# Patient Record
Sex: FEMALE | Race: WHITE | Employment: OTHER | URBAN - METROPOLITAN AREA
[De-identification: names, ages, dates, MRNs, and addresses within clinical notes are randomized per-mention and may not be internally consistent; named-entity substitution may affect disease eponyms.]

---

## 2023-03-09 DIAGNOSIS — E78.5 HYPERLIPIDEMIA, UNSPECIFIED HYPERLIPIDEMIA TYPE: Primary | ICD-10-CM

## 2023-03-09 RX ORDER — ATORVASTATIN CALCIUM 10 MG/1
TABLET, FILM COATED ORAL
Qty: 90 TABLET | Refills: 3 | Status: SHIPPED | OUTPATIENT
Start: 2023-03-09 | End: 2024-04-01 | Stop reason: SDUPTHER

## 2023-05-07 DIAGNOSIS — E03.9 HYPOTHYROIDISM, UNSPECIFIED TYPE: Primary | ICD-10-CM

## 2023-05-08 RX ORDER — LEVOTHYROXINE SODIUM 88 UG/1
TABLET ORAL
Qty: 90 TABLET | Refills: 3 | Status: SHIPPED | OUTPATIENT
Start: 2023-05-08 | End: 2024-03-08 | Stop reason: SDUPTHER

## 2023-05-25 ENCOUNTER — TELEPHONE (OUTPATIENT)
Dept: PRIMARY CARE | Facility: CLINIC | Age: 83
End: 2023-05-25

## 2023-05-25 ENCOUNTER — TELEPHONE (OUTPATIENT)
Dept: PRIMARY CARE | Facility: CLINIC | Age: 83
End: 2023-05-25
Payer: MEDICARE

## 2023-05-25 DIAGNOSIS — R73.03 PREDIABETES: ICD-10-CM

## 2023-05-25 DIAGNOSIS — M81.0 OSTEOPOROSIS WITHOUT CURRENT PATHOLOGICAL FRACTURE, UNSPECIFIED OSTEOPOROSIS TYPE: ICD-10-CM

## 2023-05-25 DIAGNOSIS — E78.2 MIXED HYPERLIPIDEMIA: ICD-10-CM

## 2023-05-25 DIAGNOSIS — E03.9 HYPOTHYROIDISM, ADULT: Primary | ICD-10-CM

## 2023-05-25 PROBLEM — L93.0 CHRONIC DISCOID LUPUS ERYTHEMATOSUS: Status: ACTIVE | Noted: 2023-05-25

## 2023-05-25 PROBLEM — T78.40XA ALLERGY: Status: ACTIVE | Noted: 2023-05-25

## 2023-05-25 PROBLEM — G45.9 TIA (TRANSIENT ISCHEMIC ATTACK): Status: ACTIVE | Noted: 2023-05-25

## 2023-05-25 PROBLEM — I77.3 FIBROMUSCULAR DYSPLASIA (CMS-HCC): Status: ACTIVE | Noted: 2023-05-25

## 2023-05-25 PROBLEM — M19.041 OSTEOARTHRITIS OF FINGER OF RIGHT HAND: Status: ACTIVE | Noted: 2023-05-25

## 2023-05-25 PROBLEM — I95.1 AUTONOMIC POSTURAL HYPOTENSION: Status: ACTIVE | Noted: 2023-05-25

## 2023-05-25 PROBLEM — I65.23 ATHEROSCLEROSIS OF BOTH CAROTID ARTERIES: Status: ACTIVE | Noted: 2023-05-25

## 2023-05-25 PROBLEM — G43.109 MIGRAINE AURA WITHOUT HEADACHE: Status: ACTIVE | Noted: 2023-05-25

## 2023-05-25 PROBLEM — M19.049 OSTEOARTHRITIS, HAND: Status: ACTIVE | Noted: 2023-05-25

## 2023-05-25 PROBLEM — L80 PRIMARY VITILIGO: Status: ACTIVE | Noted: 2023-05-25

## 2023-05-31 ENCOUNTER — LAB (OUTPATIENT)
Dept: LAB | Facility: LAB | Age: 83
End: 2023-05-31
Payer: MEDICARE

## 2023-05-31 DIAGNOSIS — E78.2 MIXED HYPERLIPIDEMIA: ICD-10-CM

## 2023-05-31 DIAGNOSIS — M81.0 OSTEOPOROSIS WITHOUT CURRENT PATHOLOGICAL FRACTURE, UNSPECIFIED OSTEOPOROSIS TYPE: ICD-10-CM

## 2023-05-31 DIAGNOSIS — E03.9 HYPOTHYROIDISM, ADULT: ICD-10-CM

## 2023-05-31 DIAGNOSIS — R73.03 PREDIABETES: ICD-10-CM

## 2023-05-31 LAB
ALANINE AMINOTRANSFERASE (SGPT) (U/L) IN SER/PLAS: 15 U/L (ref 7–45)
ALBUMIN (G/DL) IN SER/PLAS: 4.2 G/DL (ref 3.4–5)
ALKALINE PHOSPHATASE (U/L) IN SER/PLAS: 53 U/L (ref 33–136)
ANION GAP IN SER/PLAS: 11 MMOL/L (ref 10–20)
ASPARTATE AMINOTRANSFERASE (SGOT) (U/L) IN SER/PLAS: 22 U/L (ref 9–39)
BASOPHILS (10*3/UL) IN BLOOD BY AUTOMATED COUNT: 0.05 X10E9/L (ref 0–0.1)
BASOPHILS/100 LEUKOCYTES IN BLOOD BY AUTOMATED COUNT: 1 % (ref 0–2)
BILIRUBIN TOTAL (MG/DL) IN SER/PLAS: 0.8 MG/DL (ref 0–1.2)
CALCIDIOL (25 OH VITAMIN D3) (NG/ML) IN SER/PLAS: 79 NG/ML
CALCIUM (MG/DL) IN SER/PLAS: 9.9 MG/DL (ref 8.6–10.6)
CARBON DIOXIDE, TOTAL (MMOL/L) IN SER/PLAS: 31 MMOL/L (ref 21–32)
CHLORIDE (MMOL/L) IN SER/PLAS: 101 MMOL/L (ref 98–107)
CHOLESTEROL (MG/DL) IN SER/PLAS: 179 MG/DL (ref 0–199)
CHOLESTEROL IN HDL (MG/DL) IN SER/PLAS: 80.2 MG/DL
CHOLESTEROL/HDL RATIO: 2.2
CREATININE (MG/DL) IN SER/PLAS: 0.81 MG/DL (ref 0.5–1.05)
EOSINOPHILS (10*3/UL) IN BLOOD BY AUTOMATED COUNT: 0.1 X10E9/L (ref 0–0.4)
EOSINOPHILS/100 LEUKOCYTES IN BLOOD BY AUTOMATED COUNT: 1.9 % (ref 0–6)
ERYTHROCYTE DISTRIBUTION WIDTH (RATIO) BY AUTOMATED COUNT: 13.2 % (ref 11.5–14.5)
ERYTHROCYTE MEAN CORPUSCULAR HEMOGLOBIN CONCENTRATION (G/DL) BY AUTOMATED: 32.2 G/DL (ref 32–36)
ERYTHROCYTE MEAN CORPUSCULAR VOLUME (FL) BY AUTOMATED COUNT: 96 FL (ref 80–100)
ERYTHROCYTES (10*6/UL) IN BLOOD BY AUTOMATED COUNT: 4.68 X10E12/L (ref 4–5.2)
ESTIMATED AVERAGE GLUCOSE FOR HBA1C: 114 MG/DL
GFR FEMALE: 73 ML/MIN/1.73M2
GLUCOSE (MG/DL) IN SER/PLAS: 91 MG/DL (ref 74–99)
HEMATOCRIT (%) IN BLOOD BY AUTOMATED COUNT: 44.7 % (ref 36–46)
HEMOGLOBIN (G/DL) IN BLOOD: 14.4 G/DL (ref 12–16)
HEMOGLOBIN A1C/HEMOGLOBIN TOTAL IN BLOOD: 5.6 %
IMMATURE GRANULOCYTES/100 LEUKOCYTES IN BLOOD BY AUTOMATED COUNT: 0.2 % (ref 0–0.9)
LDL: 88 MG/DL (ref 0–99)
LEUKOCYTES (10*3/UL) IN BLOOD BY AUTOMATED COUNT: 5.2 X10E9/L (ref 4.4–11.3)
LYMPHOCYTES (10*3/UL) IN BLOOD BY AUTOMATED COUNT: 2.44 X10E9/L (ref 0.8–3)
LYMPHOCYTES/100 LEUKOCYTES IN BLOOD BY AUTOMATED COUNT: 46.6 % (ref 13–44)
MONOCYTES (10*3/UL) IN BLOOD BY AUTOMATED COUNT: 0.44 X10E9/L (ref 0.05–0.8)
MONOCYTES/100 LEUKOCYTES IN BLOOD BY AUTOMATED COUNT: 8.4 % (ref 2–10)
NEUTROPHILS (10*3/UL) IN BLOOD BY AUTOMATED COUNT: 2.2 X10E9/L (ref 1.6–5.5)
NEUTROPHILS/100 LEUKOCYTES IN BLOOD BY AUTOMATED COUNT: 41.9 % (ref 40–80)
NRBC (PER 100 WBCS) BY AUTOMATED COUNT: 0 /100 WBC (ref 0–0)
PLATELETS (10*3/UL) IN BLOOD AUTOMATED COUNT: 335 X10E9/L (ref 150–450)
POTASSIUM (MMOL/L) IN SER/PLAS: 4.4 MMOL/L (ref 3.5–5.3)
PROTEIN TOTAL: 6.9 G/DL (ref 6.4–8.2)
SODIUM (MMOL/L) IN SER/PLAS: 139 MMOL/L (ref 136–145)
THYROTROPIN (MIU/L) IN SER/PLAS BY DETECTION LIMIT <= 0.05 MIU/L: 2.14 MIU/L (ref 0.44–3.98)
TRIGLYCERIDE (MG/DL) IN SER/PLAS: 52 MG/DL (ref 0–149)
UREA NITROGEN (MG/DL) IN SER/PLAS: 12 MG/DL (ref 6–23)
VLDL: 10 MG/DL (ref 0–40)

## 2023-05-31 PROCEDURE — 80053 COMPREHEN METABOLIC PANEL: CPT

## 2023-05-31 PROCEDURE — 84443 ASSAY THYROID STIM HORMONE: CPT

## 2023-05-31 PROCEDURE — 83036 HEMOGLOBIN GLYCOSYLATED A1C: CPT

## 2023-05-31 PROCEDURE — 82306 VITAMIN D 25 HYDROXY: CPT

## 2023-05-31 PROCEDURE — 80061 LIPID PANEL: CPT

## 2023-05-31 PROCEDURE — 36415 COLL VENOUS BLD VENIPUNCTURE: CPT

## 2023-05-31 PROCEDURE — 85025 COMPLETE CBC W/AUTO DIFF WBC: CPT

## 2023-06-05 ENCOUNTER — OFFICE VISIT (OUTPATIENT)
Dept: PRIMARY CARE | Facility: CLINIC | Age: 83
End: 2023-06-05
Payer: MEDICARE

## 2023-06-05 VITALS
SYSTOLIC BLOOD PRESSURE: 110 MMHG | WEIGHT: 111.7 LBS | TEMPERATURE: 97.8 F | HEART RATE: 75 BPM | OXYGEN SATURATION: 98 % | BODY MASS INDEX: 19.79 KG/M2 | RESPIRATION RATE: 12 BRPM | DIASTOLIC BLOOD PRESSURE: 64 MMHG | HEIGHT: 63 IN

## 2023-06-05 DIAGNOSIS — Z00.00 MEDICARE ANNUAL WELLNESS VISIT, SUBSEQUENT: Primary | ICD-10-CM

## 2023-06-05 DIAGNOSIS — M81.0 OSTEOPOROSIS WITHOUT CURRENT PATHOLOGICAL FRACTURE, UNSPECIFIED OSTEOPOROSIS TYPE: ICD-10-CM

## 2023-06-05 DIAGNOSIS — Z13.89 ENCOUNTER FOR SCREENING FOR OTHER DISORDER: ICD-10-CM

## 2023-06-05 DIAGNOSIS — E03.9 HYPOTHYROIDISM, UNSPECIFIED TYPE: ICD-10-CM

## 2023-06-05 PROBLEM — R55 VASOVAGAL NEAR SYNCOPE: Status: ACTIVE | Noted: 2023-06-05

## 2023-06-05 PROBLEM — Z86.19 HISTORY OF SHINGLES: Status: ACTIVE | Noted: 2023-06-05

## 2023-06-05 PROBLEM — R53.82 CHRONIC FATIGUE: Status: ACTIVE | Noted: 2023-06-05

## 2023-06-05 PROBLEM — S80.02XA CONTUSION OF KNEE, LEFT: Status: ACTIVE | Noted: 2023-06-05

## 2023-06-05 PROBLEM — L21.0 SEBORRHEA CAPITIS: Status: ACTIVE | Noted: 2023-06-05

## 2023-06-05 PROBLEM — T07.XXXA LACERATIONS OF MULTIPLE SITES WITHOUT COMPLICATION: Status: ACTIVE | Noted: 2023-06-05

## 2023-06-05 PROBLEM — R90.89 ABNORMAL CT OF BRAIN: Status: ACTIVE | Noted: 2023-06-05

## 2023-06-05 PROBLEM — J06.9 URI WITH COUGH AND CONGESTION: Status: ACTIVE | Noted: 2023-06-05

## 2023-06-05 PROBLEM — S80.01XA TRAUMATIC HEMATOMA OF RIGHT KNEE: Status: ACTIVE | Noted: 2023-06-05

## 2023-06-05 PROBLEM — S80.01XA CONTUSION OF KNEE, RIGHT: Status: ACTIVE | Noted: 2023-06-05

## 2023-06-05 PROCEDURE — G0444 DEPRESSION SCREEN ANNUAL: HCPCS | Performed by: STUDENT IN AN ORGANIZED HEALTH CARE EDUCATION/TRAINING PROGRAM

## 2023-06-05 PROCEDURE — 1036F TOBACCO NON-USER: CPT | Performed by: STUDENT IN AN ORGANIZED HEALTH CARE EDUCATION/TRAINING PROGRAM

## 2023-06-05 PROCEDURE — G0439 PPPS, SUBSEQ VISIT: HCPCS | Performed by: STUDENT IN AN ORGANIZED HEALTH CARE EDUCATION/TRAINING PROGRAM

## 2023-06-05 PROCEDURE — 1159F MED LIST DOCD IN RCRD: CPT | Performed by: STUDENT IN AN ORGANIZED HEALTH CARE EDUCATION/TRAINING PROGRAM

## 2023-06-05 PROCEDURE — 99397 PER PM REEVAL EST PAT 65+ YR: CPT | Performed by: STUDENT IN AN ORGANIZED HEALTH CARE EDUCATION/TRAINING PROGRAM

## 2023-06-05 PROCEDURE — 1170F FXNL STATUS ASSESSED: CPT | Performed by: STUDENT IN AN ORGANIZED HEALTH CARE EDUCATION/TRAINING PROGRAM

## 2023-06-05 PROCEDURE — 1160F RVW MEDS BY RX/DR IN RCRD: CPT | Performed by: STUDENT IN AN ORGANIZED HEALTH CARE EDUCATION/TRAINING PROGRAM

## 2023-06-05 RX ORDER — FLAXSEED OIL 1000 MG
CAPSULE ORAL
COMMUNITY
End: 2024-01-22 | Stop reason: WASHOUT

## 2023-06-05 RX ORDER — ASPIRIN 81 MG/1
TABLET ORAL
Status: ON HOLD | COMMUNITY
End: 2024-02-13 | Stop reason: SDUPTHER

## 2023-06-05 RX ORDER — CHOLECALCIFEROL (VITAMIN D3) 25 MCG
1 TABLET ORAL DAILY
COMMUNITY

## 2023-06-05 ASSESSMENT — PATIENT HEALTH QUESTIONNAIRE - PHQ9
1. LITTLE INTEREST OR PLEASURE IN DOING THINGS: NOT AT ALL
2. FEELING DOWN, DEPRESSED OR HOPELESS: NOT AT ALL
SUM OF ALL RESPONSES TO PHQ9 QUESTIONS 1 & 2: 0

## 2023-06-05 ASSESSMENT — ACTIVITIES OF DAILY LIVING (ADL)
MANAGING_FINANCES: INDEPENDENT
DRESSING: INDEPENDENT
DOING_HOUSEWORK: INDEPENDENT
TAKING_MEDICATION: INDEPENDENT
BATHING: INDEPENDENT
GROCERY_SHOPPING: INDEPENDENT

## 2023-06-05 ASSESSMENT — LIFESTYLE VARIABLES
HOW OFTEN DO YOU HAVE SIX OR MORE DRINKS ON ONE OCCASION: NEVER
HOW MANY STANDARD DRINKS CONTAINING ALCOHOL DO YOU HAVE ON A TYPICAL DAY: 1 OR 2

## 2023-06-05 NOTE — PROGRESS NOTES
"Subjective   Reason for Visit: Mera Ansari is an 82 y.o. female here for a Medicare Wellness visit.     Past Medical, Surgical, and Family History reviewed and updated in chart.    Reviewed all medications by prescribing practitioner or clinical pharmacist (such as prescriptions, OTCs, herbal therapies and supplements) and documented in the medical record.    HPI    Patient Care Team:  Fifi Hou MD as PCP - General  Rebel Garcia MD as PCP - [employer]age PCP     Review of Systems   All other systems reviewed and are negative.      Objective   Vitals:  /64   Pulse 75   Temp 36.6 °C (97.8 °F)   Resp 12   Ht 1.6 m (5' 3\")   Wt 50.7 kg (111 lb 11.2 oz)   SpO2 98%   BMI 19.79 kg/m²       Physical Exam  Constitutional:       General: She is not in acute distress.     Appearance: Normal appearance.   HENT:      Head: Normocephalic and atraumatic.   Eyes:      General: No scleral icterus.     Conjunctiva/sclera: Conjunctivae normal.   Cardiovascular:      Rate and Rhythm: Normal rate and regular rhythm.      Heart sounds: Normal heart sounds.   Pulmonary:      Effort: Pulmonary effort is normal.      Breath sounds: Normal breath sounds. No wheezing.   Abdominal:      General: Bowel sounds are normal. There is no distension.      Palpations: Abdomen is soft.      Tenderness: There is no abdominal tenderness.   Musculoskeletal:      Cervical back: Neck supple.      Right lower leg: No edema.      Left lower leg: No edema.   Lymphadenopathy:      Cervical: No cervical adenopathy.   Skin:     General: Skin is warm and dry.   Neurological:      General: No focal deficit present.      Mental Status: She is alert and oriented to person, place, and time.   Psychiatric:         Mood and Affect: Mood normal.         Behavior: Behavior normal.         Assessment/Plan   Problem List Items Addressed This Visit          Musculoskeletal    Osteoporosis       Endocrine/Metabolic    Hypothyroidism     Other " Visit Diagnoses       Medicare annual wellness visit, subsequent    -  Primary    Encounter for screening for other disorder

## 2023-12-06 ENCOUNTER — PATIENT MESSAGE (OUTPATIENT)
Dept: PRIMARY CARE | Facility: CLINIC | Age: 83
End: 2023-12-06
Payer: MEDICARE

## 2023-12-06 DIAGNOSIS — M25.551 PAIN OF RIGHT HIP: Primary | ICD-10-CM

## 2023-12-07 ENCOUNTER — ANCILLARY PROCEDURE (OUTPATIENT)
Dept: RADIOLOGY | Facility: CLINIC | Age: 83
End: 2023-12-07
Payer: MEDICARE

## 2023-12-07 DIAGNOSIS — M25.551 PAIN OF RIGHT HIP: ICD-10-CM

## 2023-12-07 PROCEDURE — 72100 X-RAY EXAM L-S SPINE 2/3 VWS: CPT | Performed by: RADIOLOGY

## 2023-12-07 PROCEDURE — 73502 X-RAY EXAM HIP UNI 2-3 VIEWS: CPT | Mod: RT

## 2023-12-07 PROCEDURE — 72100 X-RAY EXAM L-S SPINE 2/3 VWS: CPT | Mod: FY

## 2023-12-07 PROCEDURE — 73502 X-RAY EXAM HIP UNI 2-3 VIEWS: CPT | Mod: RIGHT SIDE | Performed by: RADIOLOGY

## 2023-12-11 DIAGNOSIS — M25.551 PAIN OF RIGHT HIP: Primary | ICD-10-CM

## 2023-12-19 ENCOUNTER — OFFICE VISIT (OUTPATIENT)
Dept: ORTHOPEDIC SURGERY | Facility: CLINIC | Age: 83
End: 2023-12-19
Payer: MEDICARE

## 2023-12-19 VITALS — BODY MASS INDEX: 19.67 KG/M2 | HEIGHT: 63 IN | WEIGHT: 111 LBS

## 2023-12-19 DIAGNOSIS — M16.10 HIP ARTHRITIS: Primary | ICD-10-CM

## 2023-12-19 PROCEDURE — 1160F RVW MEDS BY RX/DR IN RCRD: CPT | Performed by: ORTHOPAEDIC SURGERY

## 2023-12-19 PROCEDURE — 99204 OFFICE O/P NEW MOD 45 MIN: CPT | Performed by: ORTHOPAEDIC SURGERY

## 2023-12-19 PROCEDURE — 1125F AMNT PAIN NOTED PAIN PRSNT: CPT | Performed by: ORTHOPAEDIC SURGERY

## 2023-12-19 PROCEDURE — 1036F TOBACCO NON-USER: CPT | Performed by: ORTHOPAEDIC SURGERY

## 2023-12-19 PROCEDURE — 99214 OFFICE O/P EST MOD 30 MIN: CPT | Mod: 57 | Performed by: ORTHOPAEDIC SURGERY

## 2023-12-19 PROCEDURE — 1159F MED LIST DOCD IN RCRD: CPT | Performed by: ORTHOPAEDIC SURGERY

## 2023-12-19 ASSESSMENT — PAIN SCALES - GENERAL: PAINLEVEL_OUTOF10: 5 - MODERATE PAIN

## 2023-12-19 ASSESSMENT — PAIN - FUNCTIONAL ASSESSMENT: PAIN_FUNCTIONAL_ASSESSMENT: 0-10

## 2023-12-19 ASSESSMENT — PAIN DESCRIPTION - DESCRIPTORS: DESCRIPTORS: SHARP

## 2023-12-19 NOTE — PROGRESS NOTES
Very fit 83-year-old with progressive right hip pain  She has severe pain that limits her daily activity cannot walk more than 1 block without stopping  Treatment has included activity modification she is not in need of any weight loss  She has done a physician directed home exercise program  She is taking anti-inflammatory medications she wants to proceed with hip replacement surgery  Past medical,family and social histories have been reviewed and are up to date.  All other body systems have been reviewed and are negative for complaint.  Constitutional: Well-developed well-nourished   Eyes: Sclerae anicteric, pupils equal and round  HENT: Normocephalic atraumatic  Cardiovascular: Pulses full, regular rate and rhythm  Respiratory: Breathing not labored, no wheezing  Integumentary: Skin intact, no lesions or rashes  Neurological: Sensation intact, no gross strength deficits, reflexes equal  Psychiatric: Alert oriented and appropriate  Hematologic/lymphatic: No lymphadenopathy  There is no leg extremity is very limited mobility of the right hip with reproduction of pain  She has an antalgic gait  X-rays show end-stage severe arthritis with joint space narrowing and cystic changes and osteophytes  She like to proceed with total hip replacement  This patient has failed nonoperative treatment for hip arthritis which has included activity modification attempts at weight loss physical therapy and oral anti-inflammatory medication.  We have discussed the potential risks of surgery including but not limited to leg length inequality, infection, DVT, neurovascular injury persistent pain, prosthetic loosening and periprosthetic fracture.  The patient wishes to proceed with surgery  Patient understands the preoperative medical evaluation and joint replacement class will be necessary.  The patient also understands that the plan is this for her to be an outpatient procedure and that appropriate arrangements must be made for  supervision and assistance at home.

## 2023-12-20 ENCOUNTER — TELEPHONE (OUTPATIENT)
Dept: ORTHOPEDIC SURGERY | Facility: CLINIC | Age: 83
End: 2023-12-20
Payer: MEDICARE

## 2023-12-20 ENCOUNTER — PREP FOR PROCEDURE (OUTPATIENT)
Dept: ORTHOPEDIC SURGERY | Facility: CLINIC | Age: 83
End: 2023-12-20
Payer: MEDICARE

## 2023-12-20 DIAGNOSIS — M16.11 UNILATERAL PRIMARY OSTEOARTHRITIS, RIGHT HIP: Primary | ICD-10-CM

## 2023-12-22 DIAGNOSIS — M16.10 HIP ARTHRITIS: Primary | ICD-10-CM

## 2023-12-22 RX ORDER — TRAMADOL HYDROCHLORIDE 50 MG/1
50 TABLET ORAL EVERY 8 HOURS PRN
Qty: 28 TABLET | Refills: 0 | Status: ON HOLD | OUTPATIENT
Start: 2023-12-22 | End: 2024-02-13 | Stop reason: ENTERED-IN-ERROR

## 2024-01-04 ENCOUNTER — TELEPHONE (OUTPATIENT)
Dept: ORTHOPEDIC SURGERY | Facility: HOSPITAL | Age: 84
End: 2024-01-04
Payer: MEDICARE

## 2024-01-04 ENCOUNTER — TELEPHONE (OUTPATIENT)
Dept: ORTHOPEDIC SURGERY | Facility: CLINIC | Age: 84
End: 2024-01-04
Payer: MEDICARE

## 2024-01-04 DIAGNOSIS — M16.11 UNILATERAL PRIMARY OSTEOARTHRITIS, RIGHT HIP: Primary | ICD-10-CM

## 2024-01-04 ASSESSMENT — HOOS JR
GOING UP OR DOWN STAIRS: SEVERE
WALKING ON UNEVEN SURFACE: MODERATE
LYING IN BED (TURNING OVER, MAINTAINING HIP POSITION): MODERATE
HOOS JR TOTAL INTERVAL SCORE: 49.86
BENDING TO THE FLOOR TO PICK UP OBJECT: MODERATE
SITTING: MODERATE
RISING FROM SITTING: MODERATE

## 2024-01-04 NOTE — TELEPHONE ENCOUNTER
Mera Ansari  attended joint class on 1/4 and Brought a care partner to the class. The preop survey was completed and the patient provided attestation that they understand the content reviewed and that they do have a care partner identified to assist with recovery. Patient was provided with a folder of materials and instructed to call orthopedic navigator with questions.

## 2024-01-22 ENCOUNTER — OFFICE VISIT (OUTPATIENT)
Dept: DERMATOLOGY | Facility: HOSPITAL | Age: 84
End: 2024-01-22
Payer: MEDICARE

## 2024-01-22 DIAGNOSIS — L80 VITILIGO: ICD-10-CM

## 2024-01-22 DIAGNOSIS — D22.9 MULTIPLE BENIGN NEVI: Primary | ICD-10-CM

## 2024-01-22 DIAGNOSIS — L81.4 LENTIGO: ICD-10-CM

## 2024-01-22 DIAGNOSIS — Z12.83 SCREENING EXAM FOR SKIN CANCER: ICD-10-CM

## 2024-01-22 DIAGNOSIS — L82.1 SEBORRHEIC KERATOSIS: ICD-10-CM

## 2024-01-22 DIAGNOSIS — L93.1 SUBACUTE CUTANEOUS LUPUS ERYTHEMATOSUS: ICD-10-CM

## 2024-01-22 PROCEDURE — 1036F TOBACCO NON-USER: CPT | Performed by: DERMATOLOGY

## 2024-01-22 PROCEDURE — 1160F RVW MEDS BY RX/DR IN RCRD: CPT | Performed by: DERMATOLOGY

## 2024-01-22 PROCEDURE — 99213 OFFICE O/P EST LOW 20 MIN: CPT | Performed by: DERMATOLOGY

## 2024-01-22 PROCEDURE — 1125F AMNT PAIN NOTED PAIN PRSNT: CPT | Performed by: DERMATOLOGY

## 2024-01-22 ASSESSMENT — DERMATOLOGY PATIENT ASSESSMENT
DO YOU HAVE IRREGULAR MENSTRUAL CYCLES: NO
DO YOU HAVE ANY NEW OR CHANGING LESIONS: YES
ARE YOU AN ORGAN TRANSPLANT RECIPIENT: YES
ARE YOU ON BIRTH CONTROL: NO
HAVE YOU HAD OR DO YOU HAVE A STAPH INFECTION: NO
DO YOU USE A TANNING BED: NO
DO YOU USE SUNSCREEN: OCCASIONALLY
WHERE ARE THESE NEW OR CHANGING LESIONS LOCATED: FOREHEAD
HAVE YOU HAD OR DO YOU HAVE VASCULAR DISEASE: NO
ARE YOU TRYING TO GET PREGNANT: NO

## 2024-01-22 ASSESSMENT — DERMATOLOGY QUALITY OF LIFE (QOL) ASSESSMENT
RATE HOW BOTHERED YOU ARE BY SYMPTOMS OF YOUR SKIN PROBLEM (EG, ITCHING, STINGING BURNING, HURTING OR SKIN IRRITATION): 0 - NEVER BOTHERED
RATE HOW EMOTIONALLY BOTHERED YOU ARE BY YOUR SKIN PROBLEM (FOR EXAMPLE, WORRY, EMBARRASSMENT, FRUSTRATION): 0 - NEVER BOTHERED
DATE THE QUALITY-OF-LIFE ASSESSMENT WAS COMPLETED: 66861
ARE THERE EXCLUSIONS OR EXCEPTIONS FOR THE QUALITY OF LIFE ASSESSMENT: NO
RATE HOW BOTHERED YOU ARE BY EFFECTS OF YOUR SKIN PROBLEMS ON YOUR ACTIVITIES (EG, GOING OUT, ACCOMPLISHING WHAT YOU WANT, WORK ACTIVITIES OR YOUR RELATIONSHIPS WITH OTHERS): 0 - NEVER BOTHERED

## 2024-01-22 ASSESSMENT — ITCH NUMERIC RATING SCALE: HOW SEVERE IS YOUR ITCHING?: 0

## 2024-01-22 ASSESSMENT — PATIENT GLOBAL ASSESSMENT (PGA): PATIENT GLOBAL ASSESSMENT: PATIENT GLOBAL ASSESSMENT:  1 - CLEAR

## 2024-01-22 NOTE — PROGRESS NOTES
Subjective     Mera Ansari is a 83 y.o. female who presents for the following: Skin Check.     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 She also had vitiligo well-controlled on tacrolimus and SCLE of scalp well-controlled on ketoconazole shampoo.     Review of Systems:  No other skin or systemic complaints other than what is documented elsewhere in the note.    The following portions of the chart were reviewed this encounter and updated as appropriate:         Skin Cancer History  No skin cancer on file.      Specialty Problems          Dermatology Problems    Chronic discoid lupus erythematosus    Primary vitiligo    Contusion of knee, left    Contusion of knee, right    Seborrhea capitis        Objective   Well appearing patient in no apparent distress; mood and affect are within normal limits.    A full examination was performed including scalp, head, eyes, ears, nose, lips, neck, chest, axillae, abdomen, back, buttocks, bilateral upper extremities, bilateral lower extremities, hands, feet, fingers, toes, fingernails, and toenails. All findings within normal limits unless otherwise noted below.    Assessment/Plan   1. Multiple benign nevi  Brown and tan macules and papules with reassuring findings on dermoscopy    -These lesions have benign, reassuring patterns on dermoscopy  -Recommend continued self observation, and to contact the office if any changes in nevi are noticed    2. Lentigo  Tan macules    -Benign appearing on exam  -Reassurance, recommend observation    3. Seborrheic keratosis  Stuck on, waxy macule(s)/papule(s)/plaque(s) with comedo-like openings and milia like cysts    -Discussed the nature of the diagnosis  -Reassurance, recommend continued observation    4. Screening exam for skin cancer    Full body skin exam  -No lesions concerning for malignancy on the  remainder the skin exam today   - The ugly duckling sign was discussed. Monitor for any skin lesions that are different in color, shape, or size than others on body  -Sun protection was discussed. Recommend SPF 30+, hats with brims, sun protective clothing, and avoiding sun exposure between 10 AM and 2 PM whenever possible  -Recommend regular skin exams or sooner if new or changing lesions       Related Procedures  Follow Up In Dermatology - Established Patient    5. Vitiligo  Torso - Posterior (Back)  Well-demarcated depigmented patches on upper back, 3-5 cm    - long-standing, uses tacrolimus as needed, stable, does not need refills    6. Subacute cutaneous lupus erythematosus  Scalp  Clear on exam today    - She has clobetasol solution from years ago, she uses it daily  - Discussed that it likely is , but as long as she is not flaring that is fine  - Also discussed that every other day use is safer than every day use for maintenance. She agrees        Follow up 1-2 years for Full Skin Exam

## 2024-01-29 ENCOUNTER — TELEMEDICINE CLINICAL SUPPORT (OUTPATIENT)
Dept: PREADMISSION TESTING | Facility: HOSPITAL | Age: 84
End: 2024-01-29
Payer: MEDICARE

## 2024-01-29 DIAGNOSIS — M16.11 UNILATERAL PRIMARY OSTEOARTHRITIS, RIGHT HIP: ICD-10-CM

## 2024-01-29 RX ORDER — ACETAMINOPHEN 500 MG
500 TABLET ORAL DAILY PRN
COMMUNITY
End: 2024-02-15 | Stop reason: HOSPADM

## 2024-01-29 RX ORDER — IBUPROFEN 200 MG
400 TABLET ORAL EVERY 8 HOURS PRN
COMMUNITY
End: 2024-02-15 | Stop reason: HOSPADM

## 2024-02-06 ENCOUNTER — PRE-ADMISSION TESTING (OUTPATIENT)
Dept: PREADMISSION TESTING | Facility: HOSPITAL | Age: 84
End: 2024-02-06
Payer: MEDICARE

## 2024-02-06 ENCOUNTER — LAB (OUTPATIENT)
Dept: LAB | Facility: LAB | Age: 84
End: 2024-02-06
Payer: MEDICARE

## 2024-02-06 ENCOUNTER — HOSPITAL ENCOUNTER (OUTPATIENT)
Dept: RADIOLOGY | Facility: HOSPITAL | Age: 84
Discharge: HOME | End: 2024-02-06
Payer: MEDICARE

## 2024-02-06 VITALS
HEART RATE: 69 BPM | DIASTOLIC BLOOD PRESSURE: 80 MMHG | TEMPERATURE: 97.9 F | OXYGEN SATURATION: 98 % | RESPIRATION RATE: 18 BRPM | SYSTOLIC BLOOD PRESSURE: 156 MMHG | BODY MASS INDEX: 19.53 KG/M2 | HEIGHT: 63 IN | WEIGHT: 110.23 LBS

## 2024-02-06 DIAGNOSIS — R52 PAIN: ICD-10-CM

## 2024-02-06 DIAGNOSIS — Z01.818 PREOP TESTING: Primary | ICD-10-CM

## 2024-02-06 DIAGNOSIS — Z01.818 PREOP TESTING: ICD-10-CM

## 2024-02-06 LAB
ANION GAP SERPL CALC-SCNC: 12 MMOL/L (ref 10–20)
ATRIAL RATE: 66 BPM
BASOPHILS # BLD AUTO: 0.07 X10*3/UL (ref 0–0.1)
BASOPHILS NFR BLD AUTO: 1 %
BUN SERPL-MCNC: 17 MG/DL (ref 6–23)
CALCIUM SERPL-MCNC: 9.4 MG/DL (ref 8.6–10.3)
CHLORIDE SERPL-SCNC: 102 MMOL/L (ref 98–107)
CO2 SERPL-SCNC: 28 MMOL/L (ref 21–32)
CREAT SERPL-MCNC: 0.8 MG/DL (ref 0.5–1.05)
EGFRCR SERPLBLD CKD-EPI 2021: 74 ML/MIN/1.73M*2
EOSINOPHIL # BLD AUTO: 0.09 X10*3/UL (ref 0–0.4)
EOSINOPHIL NFR BLD AUTO: 1.3 %
ERYTHROCYTE [DISTWIDTH] IN BLOOD BY AUTOMATED COUNT: 13 % (ref 11.5–14.5)
GLUCOSE SERPL-MCNC: 103 MG/DL (ref 74–99)
HCT VFR BLD AUTO: 41.3 % (ref 36–46)
HGB BLD-MCNC: 13.6 G/DL (ref 12–16)
IMM GRANULOCYTES # BLD AUTO: 0.01 X10*3/UL (ref 0–0.5)
IMM GRANULOCYTES NFR BLD AUTO: 0.1 % (ref 0–0.9)
LYMPHOCYTES # BLD AUTO: 2.07 X10*3/UL (ref 0.8–3)
LYMPHOCYTES NFR BLD AUTO: 30.4 %
MCH RBC QN AUTO: 31.1 PG (ref 26–34)
MCHC RBC AUTO-ENTMCNC: 32.9 G/DL (ref 32–36)
MCV RBC AUTO: 94 FL (ref 80–100)
MONOCYTES # BLD AUTO: 0.43 X10*3/UL (ref 0.05–0.8)
MONOCYTES NFR BLD AUTO: 6.3 %
NEUTROPHILS # BLD AUTO: 4.14 X10*3/UL (ref 1.6–5.5)
NEUTROPHILS NFR BLD AUTO: 60.9 %
NRBC BLD-RTO: 0 /100 WBCS (ref 0–0)
P AXIS: 66 DEGREES
P OFFSET: 213 MS
P ONSET: 158 MS
PLATELET # BLD AUTO: 347 X10*3/UL (ref 150–450)
POTASSIUM SERPL-SCNC: 4 MMOL/L (ref 3.5–5.3)
PR INTERVAL: 138 MS
Q ONSET: 227 MS
QRS COUNT: 11 BEATS
QRS DURATION: 70 MS
QT INTERVAL: 368 MS
QTC CALCULATION(BAZETT): 385 MS
QTC FREDERICIA: 380 MS
R AXIS: 24 DEGREES
RBC # BLD AUTO: 4.38 X10*6/UL (ref 4–5.2)
SODIUM SERPL-SCNC: 138 MMOL/L (ref 136–145)
T AXIS: 69 DEGREES
T OFFSET: 411 MS
VENTRICULAR RATE: 66 BPM
WBC # BLD AUTO: 6.8 X10*3/UL (ref 4.4–11.3)

## 2024-02-06 PROCEDURE — 80048 BASIC METABOLIC PNL TOTAL CA: CPT

## 2024-02-06 PROCEDURE — 73502 X-RAY EXAM HIP UNI 2-3 VIEWS: CPT | Mod: RT

## 2024-02-06 PROCEDURE — 99204 OFFICE O/P NEW MOD 45 MIN: CPT | Performed by: PHYSICIAN ASSISTANT

## 2024-02-06 PROCEDURE — 73502 X-RAY EXAM HIP UNI 2-3 VIEWS: CPT | Mod: RIGHT SIDE | Performed by: RADIOLOGY

## 2024-02-06 PROCEDURE — 87081 CULTURE SCREEN ONLY: CPT | Mod: AHULAB | Performed by: PHYSICIAN ASSISTANT

## 2024-02-06 PROCEDURE — 93005 ELECTROCARDIOGRAM TRACING: CPT | Performed by: PHYSICIAN ASSISTANT

## 2024-02-06 PROCEDURE — 36415 COLL VENOUS BLD VENIPUNCTURE: CPT

## 2024-02-06 PROCEDURE — 85025 COMPLETE CBC W/AUTO DIFF WBC: CPT

## 2024-02-06 RX ORDER — CHLORHEXIDINE GLUCONATE ORAL RINSE 1.2 MG/ML
SOLUTION DENTAL
Qty: 473 ML | Refills: 0 | Status: SHIPPED | OUTPATIENT
Start: 2024-02-06 | End: 2024-02-15 | Stop reason: HOSPADM

## 2024-02-06 ASSESSMENT — ENCOUNTER SYMPTOMS
EYES NEGATIVE: 1
CARDIOVASCULAR NEGATIVE: 1
HEMATOLOGIC/LYMPHATIC NEGATIVE: 1
ENDOCRINE NEGATIVE: 1
CONSTITUTIONAL NEGATIVE: 1
PSYCHIATRIC NEGATIVE: 1
GASTROINTESTINAL NEGATIVE: 1
ALLERGIC/IMMUNOLOGIC NEGATIVE: 1
RESPIRATORY NEGATIVE: 1
NEUROLOGICAL NEGATIVE: 1

## 2024-02-06 NOTE — CPM/PAT H&P
[facility] Evaluation       Name: Mera Ansari (Mera Ansari)  /Age: 1940/83 y.o.     In-Person       Chief Complaint: Unilateral primary osteoarthritis, right hip     HPI    Date of Consult: 24    Referring Provider: Dr. Macias     Surgery, Date, and Length: Right Hip Total Arthroplasty ; 24; 150 minutes     Mera Ansari  is a 83 year-old female who presents to the [facility] for perioperative risk assessment prior to surgery. She has progressive right hip pain. She has severe pain that limits her daily activity cannot walk more than 1 block without stopping.  Pain reaches 10/10 level at times and does awaken her at night.Treatment has included activity modification, NSAIDs.    This note was created in part upo, exercise without relief. personal review of patient's medical records.      Patient is scheduled to have Right Hip Total Arthroplasty     Medical History  Past Medical History:   Diagnosis Date    Chronic discoid lupus erythematosus     scalp    Hearing aid worn     HL (hearing loss)     Hyperlipidemia     Hypothyroidism     Migraine     Neuralgia     OA (osteoarthritis)     Osteopenia     Syncope, vasovagal     no episodes recently    TIA (transient ischemic attack)     possible TIA vs ocular migraine, no residual deficit    Vitiligo     chest        STOP BANG = 1    Caprini = 8    Surgical History  Past Surgical History:   Procedure Laterality Date    BREAST LUMPECTOMY Left     benign    CATARACT EXTRACTION Bilateral     COLONOSCOPY      CT ANGIO NECK  2020    CT NECK ANGIO W AND WO IV CONTRAST 2020 [facility] ANCILLARY LEGACY    CT HEAD ANGIO W AND WO IV CONTRAST  2020    CT HEAD ANGIO W AND WO IV CONTRAST 2020 [facility] [facility]CILL[facility] LEGACY    ELBOW FRACTURE SURGERY Left     SOFT TISSUE CYST EXCISION Left     histoplasmosis, benign.  left axilla     TONSILLECTOMY      as a child    TUBAL LIGATION               Family history:  Family History   Problem Relation  Name Age of Onset    Stroke Mother      Autoimmune disease Mother      Celiac disease Mother      Prostate cancer Father      Autoimmune disease Sister      Immunodeficiency Sister      Pernicious anemia Sister      Heart attack Brother          Social history:  Social History     Socioeconomic History    Marital status:      Spouse name: Not on file    Number of children: Not on file    Years of education: Not on file    Highest education level: Not on file   Occupational History    Not on file   Tobacco Use    Smoking status: Never    Smokeless tobacco: Never   Vaping Use    Vaping Use: Never used   Substance and Sexual Activity    Alcohol use: Not Currently     Alcohol/week: 7.0 standard drinks of alcohol     Types: 7 Glasses of wine per week    Drug use: Never    Sexual activity: Not on file   Other Topics Concern    Not on file   Social History Narrative    Not on file     Social Determinants of Health     Financial Resource Strain: Not on file   Food Insecurity: Not on file   Transportation Needs: Not on file   Physical Activity: Not on file   Stress: Not on file   Social Connections: Not on file   Intimate Partner Violence: Not on file   Housing Stability: Not on file          Current Outpatient Medications:     acetaminophen (Tylenol) 500 mg tablet, Take 1 tablet (500 mg) by mouth once daily as needed for mild pain (1 - 3)., Disp: , Rfl:     aspirin 81 mg EC tablet, Take by mouth., Disp: , Rfl:     atorvastatin (Lipitor) 10 mg tablet, TAKE 1 TABLET DAILY AS DIRECTED, Disp: 90 tablet, Rfl: 3    cholecalciferol (Vitamin D-3) 25 MCG (1000 UT) tablet, Take 1 tablet (25 mcg) by mouth once daily., Disp: , Rfl:     docosahexaenoic acid/epa (FISH OIL ORAL), Take by mouth., Disp: , Rfl:     levothyroxine (Synthroid, Levoxyl) 88 mcg tablet, TAKE 1 TABLET DAILY AS DIRECTED, Disp: 90 tablet, Rfl: 3    ubidecarenone (COENZYME Q10, BULK, MISC), Take by mouth., Disp: , Rfl:     chlorhexidine (Peridex) 0.12 %  "solution, 15 ml swish and spit for 30 seconds night prior to surgery and morning of surgery, Disp: 473 mL, Rfl: 0    ibuprofen 200 mg tablet, Take 2 tablets (400 mg) by mouth every 8 hours if needed for mild pain (1 - 3)., Disp: , Rfl:     traMADol (Ultram) 50 mg tablet, Take 1 tablet (50 mg) by mouth every 8 hours if needed for severe pain (7 - 10). (Patient not taking: Reported on 2/6/2024), Disp: 28 tablet, Rfl: 0       Visit Vitals  /80 Comment: lt arm   Pulse 69   Temp 36.6 °C (97.9 °F)   Resp 18   Ht 1.59 m (5' 2.6\")   Wt 50 kg (110 lb 3.7 oz)   SpO2 98%   BMI 19.78 kg/m²   Smoking Status Never   BSA 1.49 m²        Review of Systems   Constitutional: Negative.    HENT: Negative.     Eyes: Negative.    Respiratory: Negative.     Cardiovascular: Negative.    Gastrointestinal: Negative.    Endocrine: Negative.    Genitourinary: Negative.    Musculoskeletal:         Right hip pain   Skin: Negative.    Allergic/Immunologic: Negative.    Neurological: Negative.    Hematological: Negative.    Psychiatric/Behavioral: Negative.          Physical Exam  Vitals reviewed.   Constitutional:       Appearance: Normal appearance.   HENT:      Head: Normocephalic and atraumatic.      Right Ear: External ear normal.      Left Ear: External ear normal.      Nose: Nose normal.      Mouth/Throat:      Pharynx: Oropharynx is clear.   Eyes:      Extraocular Movements: Extraocular movements intact.      Conjunctiva/sclera: Conjunctivae normal.      Pupils: Pupils are equal, round, and reactive to light.   Cardiovascular:      Rate and Rhythm: Normal rate and regular rhythm.      Heart sounds: Normal heart sounds.   Pulmonary:      Effort: Pulmonary effort is normal.      Breath sounds: Normal breath sounds.   Abdominal:      Palpations: Abdomen is soft.   Musculoskeletal:         General: Normal range of motion.      Cervical back: Normal range of motion and neck supple.   Skin:     General: Skin is warm and dry.   Neurological: "      General: No focal deficit present.      Mental Status: She is alert and oriented to person, place, and time.   Psychiatric:         Mood and Affect: Mood normal.         Behavior: Behavior normal.          PAT AIRWAY:   Airway:     Mallampati::  II    Neck ROM::  Full    Lab Results   Component Value Date    WBC 6.8 02/06/2024    HGB 13.6 02/06/2024    HCT 41.3 02/06/2024    MCV 94 02/06/2024     02/06/2024      Lab Results   Component Value Date    GLUCOSE 103 (H) 02/06/2024    CALCIUM 9.4 02/06/2024     02/06/2024    K 4.0 02/06/2024    CO2 28 02/06/2024     02/06/2024    BUN 17 02/06/2024    CREATININE 0.80 02/06/2024        EKG 2/6/24  NSR  Possible left atrial enlargement  ST abnormality  66 BPM     RCRI  1  , 6 % Risk of MACE    Cardiac  HLD - continue atorvastatin DOS    Endocrinology  Hypothyroid - continue levothyroxine DOS   Hematology       Patient instructed to ambulate as soon as possible postoperatively to decrease thromboembolic risk.      Initiate mechanical DVT prophylaxis as soon as possible and initiate chemical prophylaxis when deemed safe from a bleeding standpoint post surgery.       VTE prophylaxis per surgical team       Tests ordered in PAT: cbc, bmp, mrsa, EKG   LABS REVIEWED: unremarkable     Risk assessment complete.  Patient is scheduled for a intermediate surgical risk procedure.        Preoperative medication instructions were provided and reviewed with the patient.  Any additional testing or evaluation was explained to the patient.  Nothing by mouth instructions were discussed and patient's questions were answered prior to conclusion to this encounter.  Patient verbalized understanding of preoperative instructions given in preadmission testing; discharge instructions available in EMR.    This note was dictated by a speech recognition.  Minor errors may have been detected in a speech recognition.

## 2024-02-06 NOTE — H&P (VIEW-ONLY)
[facility] Evaluation       Name: Mera Ansari (Mera Ansari)  /Age: 1940/83 y.o.     In-Person       Chief Complaint: Unilateral primary osteoarthritis, right hip     HPI    Date of Consult: 24    Referring Provider: Dr. Macias     Surgery, Date, and Length: Right Hip Total Arthroplasty ; 24; 150 minutes     Mera Ansari  is a 83 year-old female who presents to the [facility] for perioperative risk assessment prior to surgery. She has progressive right hip pain. She has severe pain that limits her daily activity cannot walk more than 1 block without stopping.  Pain reaches 10/10 level at times and does awaken her at night.Treatment has included activity modification, NSAIDs.    This note was created in part upo, exercise without relief. personal review of patient's medical records.      Patient is scheduled to have Right Hip Total Arthroplasty     Medical History  Past Medical History:   Diagnosis Date    Chronic discoid lupus erythematosus     scalp    Hearing aid worn     HL (hearing loss)     Hyperlipidemia     Hypothyroidism     Migraine     Neuralgia     OA (osteoarthritis)     Osteopenia     Syncope, vasovagal     no episodes recently    TIA (transient ischemic attack)     possible TIA vs ocular migraine, no residual deficit    Vitiligo     chest        STOP BANG = 1    Caprini = 8    Surgical History  Past Surgical History:   Procedure Laterality Date    BREAST LUMPECTOMY Left     benign    CATARACT EXTRACTION Bilateral     COLONOSCOPY      CT ANGIO NECK  2020    CT NECK ANGIO W AND WO IV CONTRAST 2020 [facility] [facility]CILL[facility] LEGAC[facility]    CT HEAD ANGIO W AND WO IV CONTRAST  2020    CT HEAD ANGIO W AND WO IV CONTRAST 2020 [facility] [facility]CI[facility]GAC[facility]    ELBOW FRACTURE SURGERY Left     SOFT TISSUE CYST EXCISION Left     histoplasmosis, benign.  left axilla     TONSILLECTOMY      as a child    TUBAL LIGATION               Family history:  Family History   Problem Relation  Name Age of Onset    Stroke Mother      Autoimmune disease Mother      Celiac disease Mother      Prostate cancer Father      Autoimmune disease Sister      Immunodeficiency Sister      Pernicious anemia Sister      Heart attack Brother          Social history:  Social History     Socioeconomic History    Marital status:      Spouse name: Not on file    Number of children: Not on file    Years of education: Not on file    Highest education level: Not on file   Occupational History    Not on file   Tobacco Use    Smoking status: Never    Smokeless tobacco: Never   Vaping Use    Vaping Use: Never used   Substance and Sexual Activity    Alcohol use: Not Currently     Alcohol/week: 7.0 standard drinks of alcohol     Types: 7 Glasses of wine per week    Drug use: Never    Sexual activity: Not on file   Other Topics Concern    Not on file   Social History Narrative    Not on file     Social Determinants of Health     Financial Resource Strain: Not on file   Food Insecurity: Not on file   Transportation Needs: Not on file   Physical Activity: Not on file   Stress: Not on file   Social Connections: Not on file   Intimate Partner Violence: Not on file   Housing Stability: Not on file          Current Outpatient Medications:     acetaminophen (Tylenol) 500 mg tablet, Take 1 tablet (500 mg) by mouth once daily as needed for mild pain (1 - 3)., Disp: , Rfl:     aspirin 81 mg EC tablet, Take by mouth., Disp: , Rfl:     atorvastatin (Lipitor) 10 mg tablet, TAKE 1 TABLET DAILY AS DIRECTED, Disp: 90 tablet, Rfl: 3    cholecalciferol (Vitamin D-3) 25 MCG (1000 UT) tablet, Take 1 tablet (25 mcg) by mouth once daily., Disp: , Rfl:     docosahexaenoic acid/epa (FISH OIL ORAL), Take by mouth., Disp: , Rfl:     levothyroxine (Synthroid, Levoxyl) 88 mcg tablet, TAKE 1 TABLET DAILY AS DIRECTED, Disp: 90 tablet, Rfl: 3    ubidecarenone (COENZYME Q10, BULK, MISC), Take by mouth., Disp: , Rfl:     chlorhexidine (Peridex) 0.12 %  "solution, 15 ml swish and spit for 30 seconds night prior to surgery and morning of surgery, Disp: 473 mL, Rfl: 0    ibuprofen 200 mg tablet, Take 2 tablets (400 mg) by mouth every 8 hours if needed for mild pain (1 - 3)., Disp: , Rfl:     traMADol (Ultram) 50 mg tablet, Take 1 tablet (50 mg) by mouth every 8 hours if needed for severe pain (7 - 10). (Patient not taking: Reported on 2/6/2024), Disp: 28 tablet, Rfl: 0       Visit Vitals  /80 Comment: lt arm   Pulse 69   Temp 36.6 °C (97.9 °F)   Resp 18   Ht 1.59 m (5' 2.6\")   Wt 50 kg (110 lb 3.7 oz)   SpO2 98%   BMI 19.78 kg/m²   Smoking Status Never   BSA 1.49 m²        Review of Systems   Constitutional: Negative.    HENT: Negative.     Eyes: Negative.    Respiratory: Negative.     Cardiovascular: Negative.    Gastrointestinal: Negative.    Endocrine: Negative.    Genitourinary: Negative.    Musculoskeletal:         Right hip pain   Skin: Negative.    Allergic/Immunologic: Negative.    Neurological: Negative.    Hematological: Negative.    Psychiatric/Behavioral: Negative.          Physical Exam  Vitals reviewed.   Constitutional:       Appearance: Normal appearance.   HENT:      Head: Normocephalic and atraumatic.      Right Ear: External ear normal.      Left Ear: External ear normal.      Nose: Nose normal.      Mouth/Throat:      Pharynx: Oropharynx is clear.   Eyes:      Extraocular Movements: Extraocular movements intact.      Conjunctiva/sclera: Conjunctivae normal.      Pupils: Pupils are equal, round, and reactive to light.   Cardiovascular:      Rate and Rhythm: Normal rate and regular rhythm.      Heart sounds: Normal heart sounds.   Pulmonary:      Effort: Pulmonary effort is normal.      Breath sounds: Normal breath sounds.   Abdominal:      Palpations: Abdomen is soft.   Musculoskeletal:         General: Normal range of motion.      Cervical back: Normal range of motion and neck supple.   Skin:     General: Skin is warm and dry.   Neurological: "      General: No focal deficit present.      Mental Status: She is alert and oriented to person, place, and time.   Psychiatric:         Mood and Affect: Mood normal.         Behavior: Behavior normal.          PAT AIRWAY:   Airway:     Mallampati::  II    Neck ROM::  Full    Lab Results   Component Value Date    WBC 6.8 02/06/2024    HGB 13.6 02/06/2024    HCT 41.3 02/06/2024    MCV 94 02/06/2024     02/06/2024      Lab Results   Component Value Date    GLUCOSE 103 (H) 02/06/2024    CALCIUM 9.4 02/06/2024     02/06/2024    K 4.0 02/06/2024    CO2 28 02/06/2024     02/06/2024    BUN 17 02/06/2024    CREATININE 0.80 02/06/2024        EKG 2/6/24  NSR  Possible left atrial enlargement  ST abnormality  66 BPM     RCRI  1  , 6 % Risk of MACE    Cardiac  HLD - continue atorvastatin DOS    Endocrinology  Hypothyroid - continue levothyroxine DOS   Hematology       Patient instructed to ambulate as soon as possible postoperatively to decrease thromboembolic risk.      Initiate mechanical DVT prophylaxis as soon as possible and initiate chemical prophylaxis when deemed safe from a bleeding standpoint post surgery.       VTE prophylaxis per surgical team       Tests ordered in PAT: cbc, bmp, mrsa, EKG   LABS REVIEWED: unremarkable     Risk assessment complete.  Patient is scheduled for a intermediate surgical risk procedure.        Preoperative medication instructions were provided and reviewed with the patient.  Any additional testing or evaluation was explained to the patient.  Nothing by mouth instructions were discussed and patient's questions were answered prior to conclusion to this encounter.  Patient verbalized understanding of preoperative instructions given in preadmission testing; discharge instructions available in EMR.    This note was dictated by a speech recognition.  Minor errors may have been detected in a speech recognition.

## 2024-02-06 NOTE — PREPROCEDURE INSTRUCTIONS
Medication List            Accurate as of February 6, 2024  1:07 PM. Always use your most recent med list.                acetaminophen 500 mg tablet  Commonly known as: Tylenol  Notes to patient: Use if needed morning of surgery     aspirin 81 mg EC tablet  Medication Adjustments for Surgery: Take morning of surgery with sip of water, no other fluids     atorvastatin 10 mg tablet  Commonly known as: Lipitor  TAKE 1 TABLET DAILY AS DIRECTED  Medication Adjustments for Surgery: Take morning of surgery with sip of water, no other fluids     cholecalciferol 25 MCG (1000 UT) tablet  Commonly known as: Vitamin D-3  Medication Adjustments for Surgery: Continue until night before surgery     COENZYME Q10 (BULK) MISC  Medication Adjustments for Surgery: Stop 7 days before surgery     FISH OIL ORAL  Medication Adjustments for Surgery: Stop 7 days before surgery     ibuprofen 200 mg tablet  Medication Adjustments for Surgery: Stop 7 days before surgery     levothyroxine 88 mcg tablet  Commonly known as: Synthroid, Levoxyl  TAKE 1 TABLET DAILY AS DIRECTED  Medication Adjustments for Surgery: Take morning of surgery with sip of water, no other fluids     traMADol 50 mg tablet  Commonly known as: Ultram  Take 1 tablet (50 mg) by mouth every 8 hours if needed for severe pain (7 - 10).  Notes to patient: Use if needed morning of surgery                 CONTACT SURGEON'S OFFICE IF YOU DEVELOP:  * Fever = 100.4 F   * New respiratory symptoms (e.g. cough, shortness of breath, respiratory distress, sore throat)  * Recent loss of taste or smell  *Flu like symptoms such as headache, fatigue or gastrointestinal symptoms  * You develop any open sores, shingles, burning or painful urination   AND/OR:  * You no longer wish to have the surgery.  * Any other personal circumstances change that may lead to the need to cancel or defer this surgery.  *You were admitted to any hospital within one week of your planned  procedure.    SMOKING:  *Quitting smoking can make a huge difference to your health and recovery from surgery.    *If you need help with quitting, call 8-117-QUIT-NOW.    THE DAY BEFORE SURGERY:  *Do not eat any food after midnight the night before surgery.   *You are permitted to drink clear liquids (i.e. water, black coffee, tea, clear broth, apple juice) up to 2 hours before your surgery.    DIABETICS:  Nothing by mouth (solid or liquid) for 8 hours prior to surgery. Please check fasting blood sugar upon waking up.  If fasting sugar is <80 mg/dl, please drink 100ml/3oz of apple juice no later than 2 hours prior to surgery.      SURGICAL TIME  *You will be contacted between 2 p.m. and 6 p.m. the business day before your surgery with your arrival time.  *If you haven't received a call by 6pm, call [phone].  *Scheduled surgery times may change and you will be notified if this occurs-check your personal voicemail for any updates.    ON THE MORNING OF SURGERY:  *Wear comfortable, loose fitting clothing.   *Do not use moisturizers, creams, lotions or perfume.  *All jewelry and valuables should be left at home.  *Prosthetic devices such as contact lenses, hearing aids, dentures, eyelash extensions, hairpins and body piercing must be removed before surgery.    BRING WITH YOU:  *Photo ID and insurance card  *Current list of medicines and allergies  *Pacemaker/Defibrillator/Heart stent cards  *CPAP machine and mask  *Slings/splints/crutches  *Copy of your complete Advanced Directive/DHPOA-if applicable  *Neurostimulator implant remote    PARKING AND ARRIVAL:  *Check in at the Main Entrance desk and let them know you are here for surgery.  *You will be directed to the 2nd floor surgical waiting area.    AFTER OUTPATIENT SURGERY:  *A responsible adult MUST accompany you at the time of discharge and stay with you for 24 hours after your surgery.  *You may NOT drive yourself home after surgery.  *You may use a taxi or  ride sharing service (Lyft, Uber) to return home ONLY if you are accompanied by a friend or family member.  *Instructions for resuming your medications will be provided by your surgeon.

## 2024-02-08 LAB — STAPHYLOCOCCUS SPEC CULT: NORMAL

## 2024-02-12 ENCOUNTER — ANESTHESIA EVENT (OUTPATIENT)
Dept: OPERATING ROOM | Facility: HOSPITAL | Age: 84
DRG: 470 | End: 2024-02-12
Payer: MEDICARE

## 2024-02-13 ENCOUNTER — APPOINTMENT (OUTPATIENT)
Dept: RADIOLOGY | Facility: HOSPITAL | Age: 84
DRG: 470 | End: 2024-02-13
Payer: MEDICARE

## 2024-02-13 ENCOUNTER — APPOINTMENT (OUTPATIENT)
Dept: CARDIOLOGY | Facility: HOSPITAL | Age: 84
DRG: 470 | End: 2024-02-13
Payer: MEDICARE

## 2024-02-13 ENCOUNTER — ANESTHESIA (OUTPATIENT)
Dept: OPERATING ROOM | Facility: HOSPITAL | Age: 84
DRG: 470 | End: 2024-02-13
Payer: MEDICARE

## 2024-02-13 ENCOUNTER — DOCUMENTATION (OUTPATIENT)
Dept: HOME HEALTH SERVICES | Facility: HOME HEALTH | Age: 84
End: 2024-02-13

## 2024-02-13 ENCOUNTER — HOME HEALTH ADMISSION (OUTPATIENT)
Dept: HOME HEALTH SERVICES | Facility: HOME HEALTH | Age: 84
End: 2024-02-13
Payer: MEDICARE

## 2024-02-13 ENCOUNTER — HOSPITAL ENCOUNTER (INPATIENT)
Facility: HOSPITAL | Age: 84
LOS: 1 days | Discharge: HOME | DRG: 470 | End: 2024-02-15
Attending: ORTHOPAEDIC SURGERY | Admitting: HOSPITALIST
Payer: MEDICARE

## 2024-02-13 DIAGNOSIS — M16.11 UNILATERAL PRIMARY OSTEOARTHRITIS, RIGHT HIP: ICD-10-CM

## 2024-02-13 DIAGNOSIS — R55 VASOVAGAL NEAR SYNCOPE: ICD-10-CM

## 2024-02-13 DIAGNOSIS — M86.9 OSTEOMYELITIS OF FOURTH TOE OF LEFT FOOT (MULTI): ICD-10-CM

## 2024-02-13 DIAGNOSIS — R55 SYNCOPE, VASOVAGAL: Primary | ICD-10-CM

## 2024-02-13 PROCEDURE — 93005 ELECTROCARDIOGRAM TRACING: CPT

## 2024-02-13 PROCEDURE — 97162 PT EVAL MOD COMPLEX 30 MIN: CPT | Mod: GP

## 2024-02-13 PROCEDURE — 72170 X-RAY EXAM OF PELVIS: CPT

## 2024-02-13 PROCEDURE — 2500000004 HC RX 250 GENERAL PHARMACY W/ HCPCS (ALT 636 FOR OP/ED)

## 2024-02-13 PROCEDURE — 3700000001 HC GENERAL ANESTHESIA TIME - INITIAL BASE CHARGE: Performed by: ORTHOPAEDIC SURGERY

## 2024-02-13 PROCEDURE — A27130 PR TOTAL HIP ARTHROPLASTY: Performed by: NURSE ANESTHETIST, CERTIFIED REGISTERED

## 2024-02-13 PROCEDURE — RXMED WILLOW AMBULATORY MEDICATION CHARGE

## 2024-02-13 PROCEDURE — 2500000004 HC RX 250 GENERAL PHARMACY W/ HCPCS (ALT 636 FOR OP/ED): Performed by: HOSPITALIST

## 2024-02-13 PROCEDURE — 97530 THERAPEUTIC ACTIVITIES: CPT | Mod: GP

## 2024-02-13 PROCEDURE — 2500000004 HC RX 250 GENERAL PHARMACY W/ HCPCS (ALT 636 FOR OP/ED): Performed by: NURSE ANESTHETIST, CERTIFIED REGISTERED

## 2024-02-13 PROCEDURE — C1776 JOINT DEVICE (IMPLANTABLE): HCPCS | Performed by: ORTHOPAEDIC SURGERY

## 2024-02-13 PROCEDURE — 2500000002 HC RX 250 W HCPCS SELF ADMINISTERED DRUGS (ALT 637 FOR MEDICARE OP, ALT 636 FOR OP/ED)

## 2024-02-13 PROCEDURE — C1713 ANCHOR/SCREW BN/BN,TIS/BN: HCPCS | Performed by: ORTHOPAEDIC SURGERY

## 2024-02-13 PROCEDURE — 27130 TOTAL HIP ARTHROPLASTY: CPT | Performed by: ORTHOPAEDIC SURGERY

## 2024-02-13 PROCEDURE — G0378 HOSPITAL OBSERVATION PER HR: HCPCS

## 2024-02-13 PROCEDURE — 2500000005 HC RX 250 GENERAL PHARMACY W/O HCPCS: Performed by: NURSE ANESTHETIST, CERTIFIED REGISTERED

## 2024-02-13 PROCEDURE — 2500000005 HC RX 250 GENERAL PHARMACY W/O HCPCS: Performed by: HOSPITALIST

## 2024-02-13 PROCEDURE — 2500000001 HC RX 250 WO HCPCS SELF ADMINISTERED DRUGS (ALT 637 FOR MEDICARE OP): Performed by: ANESTHESIOLOGY

## 2024-02-13 PROCEDURE — A27130 PR TOTAL HIP ARTHROPLASTY: Performed by: ANESTHESIOLOGY

## 2024-02-13 PROCEDURE — 72170 X-RAY EXAM OF PELVIS: CPT | Mod: FOREIGN READ | Performed by: RADIOLOGY

## 2024-02-13 PROCEDURE — 2780000003 HC OR 278 NO HCPCS: Performed by: ORTHOPAEDIC SURGERY

## 2024-02-13 PROCEDURE — 7100000001 HC RECOVERY ROOM TIME - INITIAL BASE CHARGE: Performed by: ORTHOPAEDIC SURGERY

## 2024-02-13 PROCEDURE — 99222 1ST HOSP IP/OBS MODERATE 55: CPT | Performed by: HOSPITALIST

## 2024-02-13 PROCEDURE — 7100000002 HC RECOVERY ROOM TIME - EACH INCREMENTAL 1 MINUTE: Performed by: ORTHOPAEDIC SURGERY

## 2024-02-13 PROCEDURE — 3700000002 HC GENERAL ANESTHESIA TIME - EACH INCREMENTAL 1 MINUTE: Performed by: ORTHOPAEDIC SURGERY

## 2024-02-13 PROCEDURE — 93010 ELECTROCARDIOGRAM REPORT: CPT | Performed by: INTERNAL MEDICINE

## 2024-02-13 PROCEDURE — 3600000010 HC OR TIME - EACH INCREMENTAL 1 MINUTE - PROCEDURE LEVEL FIVE: Performed by: ORTHOPAEDIC SURGERY

## 2024-02-13 PROCEDURE — 97110 THERAPEUTIC EXERCISES: CPT | Mod: GP

## 2024-02-13 PROCEDURE — 3600000005 HC OR TIME - INITIAL BASE CHARGE - PROCEDURE LEVEL FIVE: Performed by: ORTHOPAEDIC SURGERY

## 2024-02-13 PROCEDURE — 97116 GAIT TRAINING THERAPY: CPT | Mod: GP,59

## 2024-02-13 PROCEDURE — 2500000001 HC RX 250 WO HCPCS SELF ADMINISTERED DRUGS (ALT 637 FOR MEDICARE OP)

## 2024-02-13 PROCEDURE — 2500000001 HC RX 250 WO HCPCS SELF ADMINISTERED DRUGS (ALT 637 FOR MEDICARE OP): Performed by: HOSPITALIST

## 2024-02-13 PROCEDURE — 0SR902Z REPLACEMENT OF RIGHT HIP JOINT WITH METAL ON POLYETHYLENE SYNTHETIC SUBSTITUTE, OPEN APPROACH: ICD-10-PCS | Performed by: ORTHOPAEDIC SURGERY

## 2024-02-13 PROCEDURE — 99100 ANES PT EXTEME AGE<1 YR&>70: CPT | Performed by: ANESTHESIOLOGY

## 2024-02-13 PROCEDURE — 2500000004 HC RX 250 GENERAL PHARMACY W/ HCPCS (ALT 636 FOR OP/ED): Performed by: ANESTHESIOLOGY

## 2024-02-13 DEVICE — STEM, FEMORAL, ACTIS COLLAR, STD, SIZE 6: Type: IMPLANTABLE DEVICE | Site: HIP | Status: FUNCTIONAL

## 2024-02-13 DEVICE — IMPLANTABLE DEVICE: Type: IMPLANTABLE DEVICE | Site: HIP | Status: FUNCTIONAL

## 2024-02-13 DEVICE — ACETABULAR CUP, SECTOR, GRIPTON, SIZE 52MM: Type: IMPLANTABLE DEVICE | Site: HIP | Status: FUNCTIONAL

## 2024-02-13 DEVICE — SCREW CANCELLOUS 6.5 X 25: Type: IMPLANTABLE DEVICE | Site: HIP | Status: FUNCTIONAL

## 2024-02-13 RX ORDER — LEVOTHYROXINE SODIUM 88 UG/1
88 TABLET ORAL DAILY
Status: DISCONTINUED | OUTPATIENT
Start: 2024-02-14 | End: 2024-02-15 | Stop reason: HOSPADM

## 2024-02-13 RX ORDER — PROPOFOL 10 MG/ML
INJECTION, EMULSION INTRAVENOUS CONTINUOUS PRN
Status: DISCONTINUED | OUTPATIENT
Start: 2024-02-13 | End: 2024-02-13

## 2024-02-13 RX ORDER — MIDAZOLAM HYDROCHLORIDE 1 MG/ML
INJECTION INTRAMUSCULAR; INTRAVENOUS AS NEEDED
Status: DISCONTINUED | OUTPATIENT
Start: 2024-02-13 | End: 2024-02-13

## 2024-02-13 RX ORDER — DOCUSATE SODIUM 100 MG/1
100 CAPSULE, LIQUID FILLED ORAL 2 TIMES DAILY PRN
Qty: 14 CAPSULE | Refills: 0 | Status: SHIPPED | OUTPATIENT
Start: 2024-02-13 | End: 2024-02-21

## 2024-02-13 RX ORDER — ONDANSETRON HYDROCHLORIDE 2 MG/ML
4 INJECTION, SOLUTION INTRAVENOUS ONCE AS NEEDED
Status: DISCONTINUED | OUTPATIENT
Start: 2024-02-13 | End: 2024-02-13 | Stop reason: HOSPADM

## 2024-02-13 RX ORDER — DOCUSATE SODIUM 100 MG/1
200 CAPSULE, LIQUID FILLED ORAL 2 TIMES DAILY PRN
Qty: 20 CAPSULE | Refills: 0 | Status: SHIPPED | OUTPATIENT
Start: 2024-02-13 | End: 2024-02-14 | Stop reason: HOSPADM

## 2024-02-13 RX ORDER — NORETHINDRONE AND ETHINYL ESTRADIOL 0.5-0.035
KIT ORAL AS NEEDED
Status: DISCONTINUED | OUTPATIENT
Start: 2024-02-13 | End: 2024-02-13

## 2024-02-13 RX ORDER — ASPIRIN 81 MG/1
81 TABLET ORAL 2 TIMES DAILY
Qty: 56 TABLET | Refills: 0 | Status: SHIPPED | OUTPATIENT
Start: 2024-02-13 | End: 2024-03-13

## 2024-02-13 RX ORDER — OXYCODONE HYDROCHLORIDE 5 MG/1
5 TABLET ORAL EVERY 4 HOURS PRN
Status: DISCONTINUED | OUTPATIENT
Start: 2024-02-13 | End: 2024-02-13

## 2024-02-13 RX ORDER — OXYCODONE HYDROCHLORIDE 5 MG/1
5 TABLET ORAL EVERY 6 HOURS PRN
Status: DISCONTINUED | OUTPATIENT
Start: 2024-02-13 | End: 2024-02-15 | Stop reason: HOSPADM

## 2024-02-13 RX ORDER — FENTANYL CITRATE 50 UG/ML
INJECTION, SOLUTION INTRAMUSCULAR; INTRAVENOUS AS NEEDED
Status: DISCONTINUED | OUTPATIENT
Start: 2024-02-13 | End: 2024-02-13

## 2024-02-13 RX ORDER — TRANEXAMIC ACID 650 MG/1
1300 TABLET ORAL ONCE
Status: COMPLETED | OUTPATIENT
Start: 2024-02-13 | End: 2024-02-13

## 2024-02-13 RX ORDER — ACETAMINOPHEN 325 MG/1
975 TABLET ORAL 3 TIMES DAILY
Status: DISCONTINUED | OUTPATIENT
Start: 2024-02-13 | End: 2024-02-15 | Stop reason: HOSPADM

## 2024-02-13 RX ORDER — ENOXAPARIN SODIUM 100 MG/ML
40 INJECTION SUBCUTANEOUS EVERY 24 HOURS
Status: DISCONTINUED | OUTPATIENT
Start: 2024-02-13 | End: 2024-02-15 | Stop reason: HOSPADM

## 2024-02-13 RX ORDER — KETOROLAC TROMETHAMINE 10 MG/1
10 TABLET, FILM COATED ORAL EVERY 8 HOURS PRN
Qty: 9 TABLET | Refills: 0 | Status: SHIPPED | OUTPATIENT
Start: 2024-02-13 | End: 2024-02-17

## 2024-02-13 RX ORDER — CELECOXIB 200 MG/1
200 CAPSULE ORAL ONCE
Status: COMPLETED | OUTPATIENT
Start: 2024-02-13 | End: 2024-02-13

## 2024-02-13 RX ORDER — ACETAMINOPHEN 325 MG/1
975 TABLET ORAL ONCE
Status: COMPLETED | OUTPATIENT
Start: 2024-02-13 | End: 2024-02-13

## 2024-02-13 RX ORDER — PREGABALIN 75 MG/1
75 CAPSULE ORAL ONCE
Status: COMPLETED | OUTPATIENT
Start: 2024-02-13 | End: 2024-02-13

## 2024-02-13 RX ORDER — LIDOCAINE 560 MG/1
1 PATCH PERCUTANEOUS; TOPICAL; TRANSDERMAL DAILY
Status: DISCONTINUED | OUTPATIENT
Start: 2024-02-13 | End: 2024-02-15 | Stop reason: HOSPADM

## 2024-02-13 RX ORDER — ONDANSETRON HYDROCHLORIDE 2 MG/ML
4 INJECTION, SOLUTION INTRAVENOUS EVERY 8 HOURS PRN
Status: DISCONTINUED | OUTPATIENT
Start: 2024-02-13 | End: 2024-02-15 | Stop reason: HOSPADM

## 2024-02-13 RX ORDER — TRANEXAMIC ACID 100 MG/ML
INJECTION, SOLUTION INTRAVENOUS AS NEEDED
Status: DISCONTINUED | OUTPATIENT
Start: 2024-02-13 | End: 2024-02-13

## 2024-02-13 RX ORDER — OXYCODONE HYDROCHLORIDE 5 MG/1
5 TABLET ORAL EVERY 6 HOURS PRN
Qty: 28 TABLET | Refills: 0 | Status: SHIPPED | OUTPATIENT
Start: 2024-02-13 | End: 2024-02-21

## 2024-02-13 RX ORDER — CEFAZOLIN 1 G/1
INJECTION, POWDER, FOR SOLUTION INTRAVENOUS AS NEEDED
Status: DISCONTINUED | OUTPATIENT
Start: 2024-02-13 | End: 2024-02-13

## 2024-02-13 RX ORDER — SODIUM CHLORIDE, SODIUM LACTATE, POTASSIUM CHLORIDE, CALCIUM CHLORIDE 600; 310; 30; 20 MG/100ML; MG/100ML; MG/100ML; MG/100ML
INJECTION, SOLUTION INTRAVENOUS CONTINUOUS PRN
Status: DISCONTINUED | OUTPATIENT
Start: 2024-02-13 | End: 2024-02-13

## 2024-02-13 RX ORDER — ONDANSETRON HYDROCHLORIDE 2 MG/ML
INJECTION, SOLUTION INTRAVENOUS AS NEEDED
Status: DISCONTINUED | OUTPATIENT
Start: 2024-02-13 | End: 2024-02-13

## 2024-02-13 RX ORDER — POLYETHYLENE GLYCOL 3350 17 G/17G
17 POWDER, FOR SOLUTION ORAL DAILY
Status: DISCONTINUED | OUTPATIENT
Start: 2024-02-14 | End: 2024-02-15 | Stop reason: HOSPADM

## 2024-02-13 RX ORDER — SODIUM CHLORIDE, SODIUM LACTATE, POTASSIUM CHLORIDE, CALCIUM CHLORIDE 600; 310; 30; 20 MG/100ML; MG/100ML; MG/100ML; MG/100ML
100 INJECTION, SOLUTION INTRAVENOUS CONTINUOUS
Status: DISCONTINUED | OUTPATIENT
Start: 2024-02-13 | End: 2024-02-13

## 2024-02-13 RX ORDER — ATORVASTATIN CALCIUM 10 MG/1
10 TABLET, FILM COATED ORAL DAILY
Status: DISCONTINUED | OUTPATIENT
Start: 2024-02-14 | End: 2024-02-15 | Stop reason: HOSPADM

## 2024-02-13 RX ORDER — DEXAMETHASONE SODIUM PHOSPHATE 4 MG/ML
INJECTION, SOLUTION INTRA-ARTICULAR; INTRALESIONAL; INTRAMUSCULAR; INTRAVENOUS; SOFT TISSUE AS NEEDED
Status: DISCONTINUED | OUTPATIENT
Start: 2024-02-13 | End: 2024-02-13

## 2024-02-13 RX ORDER — HYDRALAZINE HYDROCHLORIDE 20 MG/ML
5 INJECTION INTRAMUSCULAR; INTRAVENOUS EVERY 30 MIN PRN
Status: DISCONTINUED | OUTPATIENT
Start: 2024-02-13 | End: 2024-02-13

## 2024-02-13 RX ORDER — PHENYLEPHRINE HCL IN 0.9% NACL 1 MG/10 ML
SYRINGE (ML) INTRAVENOUS AS NEEDED
Status: DISCONTINUED | OUTPATIENT
Start: 2024-02-13 | End: 2024-02-13

## 2024-02-13 RX ORDER — CEFAZOLIN SODIUM 2 G/100ML
2 INJECTION, SOLUTION INTRAVENOUS EVERY 6 HOURS
Status: DISCONTINUED | OUTPATIENT
Start: 2024-02-13 | End: 2024-02-13 | Stop reason: HOSPADM

## 2024-02-13 RX ADMIN — DEXAMETHASONE SODIUM PHOSPHATE 4 MG: 4 INJECTION, SOLUTION INTRA-ARTICULAR; INTRALESIONAL; INTRAMUSCULAR; INTRAVENOUS; SOFT TISSUE at 09:18

## 2024-02-13 RX ADMIN — ACETAMINOPHEN 975 MG: 325 TABLET ORAL at 06:42

## 2024-02-13 RX ADMIN — PREGABALIN 75 MG: 75 CAPSULE ORAL at 06:43

## 2024-02-13 RX ADMIN — Medication 100 MCG: at 09:52

## 2024-02-13 RX ADMIN — ENOXAPARIN SODIUM 40 MG: 40 INJECTION SUBCUTANEOUS at 23:09

## 2024-02-13 RX ADMIN — LIDOCAINE 1 PATCH: 4 PATCH TOPICAL at 20:29

## 2024-02-13 RX ADMIN — CEFAZOLIN SODIUM 2 G: 2 INJECTION, SOLUTION INTRAVENOUS at 15:45

## 2024-02-13 RX ADMIN — Medication 200 MCG: at 09:32

## 2024-02-13 RX ADMIN — ONDANSETRON 4 MG: 2 INJECTION, SOLUTION INTRAMUSCULAR; INTRAVENOUS at 09:18

## 2024-02-13 RX ADMIN — OXYCODONE HYDROCHLORIDE 5 MG: 5 TABLET ORAL at 19:49

## 2024-02-13 RX ADMIN — Medication 100 MCG: at 09:18

## 2024-02-13 RX ADMIN — Medication 100 MCG: at 09:10

## 2024-02-13 RX ADMIN — TRANEXAMIC ACID 1000 MG: 100 INJECTION, SOLUTION INTRAVENOUS at 08:47

## 2024-02-13 RX ADMIN — EPHEDRINE SULFATE 10 MG: 50 INJECTION, SOLUTION INTRAVENOUS at 09:50

## 2024-02-13 RX ADMIN — Medication 25 MG: at 09:00

## 2024-02-13 RX ADMIN — MIDAZOLAM HYDROCHLORIDE 1 MG: 1 INJECTION, SOLUTION INTRAMUSCULAR; INTRAVENOUS at 08:34

## 2024-02-13 RX ADMIN — SODIUM CHLORIDE, POTASSIUM CHLORIDE, SODIUM LACTATE AND CALCIUM CHLORIDE: 600; 310; 30; 20 INJECTION, SOLUTION INTRAVENOUS at 08:30

## 2024-02-13 RX ADMIN — CELECOXIB 200 MG: 200 CAPSULE ORAL at 06:43

## 2024-02-13 RX ADMIN — OXYCODONE HYDROCHLORIDE 5 MG: 5 TABLET ORAL at 10:58

## 2024-02-13 RX ADMIN — EPHEDRINE SULFATE 10 MG: 50 INJECTION, SOLUTION INTRAVENOUS at 09:24

## 2024-02-13 RX ADMIN — EPHEDRINE SULFATE 10 MG: 50 INJECTION, SOLUTION INTRAVENOUS at 09:34

## 2024-02-13 RX ADMIN — FENTANYL CITRATE 25 MCG: 50 INJECTION, SOLUTION INTRAMUSCULAR; INTRAVENOUS at 09:50

## 2024-02-13 RX ADMIN — HYDROMORPHONE HYDROCHLORIDE 0.5 MG: 1 INJECTION, SOLUTION INTRAMUSCULAR; INTRAVENOUS; SUBCUTANEOUS at 11:21

## 2024-02-13 RX ADMIN — TRANEXAMIC ACID 1300 MG: 650 TABLET ORAL at 12:31

## 2024-02-13 RX ADMIN — PROPOFOL 100 MCG/KG/MIN: 10 INJECTION, EMULSION INTRAVENOUS at 08:41

## 2024-02-13 RX ADMIN — ACETAMINOPHEN 975 MG: 325 TABLET ORAL at 20:29

## 2024-02-13 RX ADMIN — Medication 100 MCG: at 09:16

## 2024-02-13 RX ADMIN — CEFAZOLIN 2 G: 330 INJECTION, POWDER, FOR SOLUTION INTRAMUSCULAR; INTRAVENOUS at 08:45

## 2024-02-13 SDOH — SOCIAL STABILITY: SOCIAL INSECURITY: DOES ANYONE TRY TO KEEP YOU FROM HAVING/CONTACTING OTHER FRIENDS OR DOING THINGS OUTSIDE YOUR HOME?: NO

## 2024-02-13 SDOH — SOCIAL STABILITY: SOCIAL INSECURITY: HAS ANYONE EVER THREATENED TO HURT YOUR FAMILY OR YOUR PETS?: NO

## 2024-02-13 SDOH — SOCIAL STABILITY: SOCIAL INSECURITY: HAVE YOU HAD THOUGHTS OF HARMING ANYONE ELSE?: NO

## 2024-02-13 SDOH — SOCIAL STABILITY: SOCIAL INSECURITY: ARE THERE ANY APPARENT SIGNS OF INJURIES/BEHAVIORS THAT COULD BE RELATED TO ABUSE/NEGLECT?: NO

## 2024-02-13 SDOH — SOCIAL STABILITY: SOCIAL INSECURITY: ABUSE: ADULT

## 2024-02-13 SDOH — SOCIAL STABILITY: SOCIAL INSECURITY: DO YOU FEEL ANYONE HAS EXPLOITED OR TAKEN ADVANTAGE OF YOU FINANCIALLY OR OF YOUR PERSONAL PROPERTY?: NO

## 2024-02-13 SDOH — SOCIAL STABILITY: SOCIAL INSECURITY: WERE YOU ABLE TO COMPLETE ALL THE BEHAVIORAL HEALTH SCREENINGS?: YES

## 2024-02-13 SDOH — SOCIAL STABILITY: SOCIAL INSECURITY: DO YOU FEEL UNSAFE GOING BACK TO THE PLACE WHERE YOU ARE LIVING?: NO

## 2024-02-13 SDOH — SOCIAL STABILITY: SOCIAL INSECURITY: ARE YOU OR HAVE YOU BEEN THREATENED OR ABUSED PHYSICALLY, EMOTIONALLY, OR SEXUALLY BY ANYONE?: NO

## 2024-02-13 ASSESSMENT — PAIN SCALES - GENERAL
PAINLEVEL_OUTOF10: 5 - MODERATE PAIN
PAINLEVEL_OUTOF10: 5 - MODERATE PAIN
PAIN_LEVEL: 2
PAINLEVEL_OUTOF10: 6
PAINLEVEL_OUTOF10: 4
PAINLEVEL_OUTOF10: 5 - MODERATE PAIN
PAINLEVEL_OUTOF10: 4
PAINLEVEL_OUTOF10: 0 - NO PAIN
PAINLEVEL_OUTOF10: 8
PAINLEVEL_OUTOF10: 4
PAINLEVEL_OUTOF10: 7
PAINLEVEL_OUTOF10: 6
PAINLEVEL_OUTOF10: 6
PAINLEVEL_OUTOF10: 5 - MODERATE PAIN

## 2024-02-13 ASSESSMENT — PAIN - FUNCTIONAL ASSESSMENT

## 2024-02-13 ASSESSMENT — COGNITIVE AND FUNCTIONAL STATUS - GENERAL
TURNING FROM BACK TO SIDE WHILE IN FLAT BAD: A LITTLE
STANDING UP FROM CHAIR USING ARMS: A LITTLE
WALKING IN HOSPITAL ROOM: A LITTLE
MOVING FROM LYING ON BACK TO SITTING ON SIDE OF FLAT BED WITH BEDRAILS: A LITTLE
MOVING TO AND FROM BED TO CHAIR: A LITTLE
TURNING FROM BACK TO SIDE WHILE IN FLAT BAD: A LITTLE
CLIMB 3 TO 5 STEPS WITH RAILING: A LOT
WALKING IN HOSPITAL ROOM: A LITTLE
PERSONAL GROOMING: A LITTLE
MOBILITY SCORE: 16
TOILETING: A LITTLE
CLIMB 3 TO 5 STEPS WITH RAILING: TOTAL
MOBILITY SCORE: 17
STANDING UP FROM CHAIR USING ARMS: A LITTLE
MOVING TO AND FROM BED TO CHAIR: A LITTLE
DAILY ACTIVITIY SCORE: 22
MOVING FROM LYING ON BACK TO SITTING ON SIDE OF FLAT BED WITH BEDRAILS: A LITTLE
PATIENT BASELINE BEDBOUND: NO

## 2024-02-13 ASSESSMENT — ACTIVITIES OF DAILY LIVING (ADL)
BATHING: NEEDS ASSISTANCE
HEARING - RIGHT EAR: FUNCTIONAL
ADEQUATE_TO_COMPLETE_ADL: YES
FEEDING YOURSELF: INDEPENDENT
LACK_OF_TRANSPORTATION: PATIENT DECLINED
HEARING - LEFT EAR: FUNCTIONAL
GROOMING: INDEPENDENT
ASSISTIVE_DEVICE: WALKER
DRESSING YOURSELF: INDEPENDENT
TOILETING: NEEDS ASSISTANCE
WALKS IN HOME: INDEPENDENT
JUDGMENT_ADEQUATE_SAFELY_COMPLETE_DAILY_ACTIVITIES: YES
ADL_ASSISTANCE: INDEPENDENT
PATIENT'S MEMORY ADEQUATE TO SAFELY COMPLETE DAILY ACTIVITIES?: YES

## 2024-02-13 ASSESSMENT — PAIN DESCRIPTION - ORIENTATION
ORIENTATION: RIGHT
ORIENTATION: RIGHT

## 2024-02-13 ASSESSMENT — COLUMBIA-SUICIDE SEVERITY RATING SCALE - C-SSRS
1. IN THE PAST MONTH, HAVE YOU WISHED YOU WERE DEAD OR WISHED YOU COULD GO TO SLEEP AND NOT WAKE UP?: NO
6. HAVE YOU EVER DONE ANYTHING, STARTED TO DO ANYTHING, OR PREPARED TO DO ANYTHING TO END YOUR LIFE?: NO
2. HAVE YOU ACTUALLY HAD ANY THOUGHTS OF KILLING YOURSELF?: NO

## 2024-02-13 ASSESSMENT — PAIN DESCRIPTION - DESCRIPTORS
DESCRIPTORS: ACHING
DESCRIPTORS: ACHING
DESCRIPTORS: ACHING;SORE
DESCRIPTORS: ACHING
DESCRIPTORS: ACHING

## 2024-02-13 ASSESSMENT — LIFESTYLE VARIABLES
HOW OFTEN DO YOU HAVE A DRINK CONTAINING ALCOHOL: NEVER
AUDIT-C TOTAL SCORE: 0
SUBSTANCE_ABUSE_PAST_12_MONTHS: NO
SKIP TO QUESTIONS 9-10: 1
HOW OFTEN DO YOU HAVE 6 OR MORE DRINKS ON ONE OCCASION: NEVER
PRESCIPTION_ABUSE_PAST_12_MONTHS: NO
AUDIT-C TOTAL SCORE: 0
HOW MANY STANDARD DRINKS CONTAINING ALCOHOL DO YOU HAVE ON A TYPICAL DAY: PATIENT DOES NOT DRINK

## 2024-02-13 ASSESSMENT — PAIN DESCRIPTION - LOCATION
LOCATION: HIP
LOCATION: HIP

## 2024-02-13 ASSESSMENT — PATIENT HEALTH QUESTIONNAIRE - PHQ9
1. LITTLE INTEREST OR PLEASURE IN DOING THINGS: NOT AT ALL
SUM OF ALL RESPONSES TO PHQ9 QUESTIONS 1 & 2: 0
2. FEELING DOWN, DEPRESSED OR HOPELESS: NOT AT ALL

## 2024-02-13 NOTE — CODE DOCUMENTATION
Pt working with PT Bonnie Reece 1452, assisting pt in bathroom with walker. Pt alerts RN Canelo that patient is feeling light headed and to get wheelchair. RN obtains wheelchair and pt is assisted to sitting in chair. RN called rapid respose 1453. Pt experienced LOC and was unreponsive and pale, transferred onto stretcher by staff and attached to monitors.     LOC returned to AxO x4 by 1455.   Pt rating pain 5/10, no changes from before episode.   167/70 BP, 97% RA, 66HR.     Hospitalist Octavio to bedside 1457. 12 lead EKG ordered.    No other interventions ordered at this time.     Rapid response ended 1505.

## 2024-02-13 NOTE — PERIOPERATIVE NURSING NOTE
1028 - Patient arrives from OR with anesthesia at bedside and OR staff. Report received. Patient A&Ox4, pain 4/10 (Ice machine applied), VSS, SCD applied. Family updated.    1054 - Patient tolerating water and cari crackers.    1058 - Patient medicated with 5 mg of oxycodone for pain rating 6/10.  Post op xray done.    1110 - Report given to Lucero OTTO.

## 2024-02-13 NOTE — BRIEF OP NOTE
Date: 2024  OR Location: [facility] OR    Name: Mera Ansari, : 1940, Age: 83 y.o., MRN: [id number], Sex: female    Diagnosis  Pre-op Diagnosis     * Unilateral primary osteoarthritis, right hip [M16.11] Post-op Diagnosis     * Unilateral primary osteoarthritis, right hip [M16.11]     Procedures  Right Hip Total Arthroplasty  57294 - NV ARTHRP ACETBLR/PROX FEM PROSTC AGRFT/ALGRFT      Surgeons      * Danie Macias - Primary    Resident/Fellow/Other Assistant:  Surgeon(s) and Role:     * Srinivas Mercer DO - Resident - Assisting    Procedure Summary  Anesthesia: Consult  ASA: II  Anesthesia Staff: Anesthesiologist: Stacy Morrell MD  CRNA: LAINEY Aguilar-CRNA  Estimated Blood Loss: 100 mL  Intra-op Medications: Administrations occurring from 0830 to 1100 on 24:  * No intraprocedure medications in log *           Anesthesia Record               Intraprocedure I/O Totals          Intake    Tranexamic Acid 0.00 mL    The total shown is the total volume documented since Anesthesia Start was filed.    Propofol Drip 50.00 mL    The total shown is the total volume documented since Anesthesia Start was filed.    LR infusion 1100.00 mL    Total Intake 1150 mL       Output    Est. Blood Loss 100 mL    Total Output 100 mL       Net    Net Volume 1050 mL          Specimen: No specimens collected     Staff:   Circulator: Chloé Womack RN  Relief Circulator: Vargas Chacko RN  Scrub Person: Won Ann          Findings: Consistent with preoperative diagnosis    Complications:  None; patient tolerated the procedure well.     Disposition: PACU - hemodynamically stable.  Condition: stable  Specimens Collected: No specimens collected  Attending Attestation:     Danie Macias  Phone Number: [phone]

## 2024-02-13 NOTE — HH CARE COORDINATION
Home Care received a Referral for Physical Therapy and Occupational Therapy. We have processed the referral for a Start of Care on 2/14/24.     If you have any questions or concerns, please feel free to contact us at [phone]. Follow the prompts, enter your five digit zip code, and you will be directed to your care team on [facility].

## 2024-02-13 NOTE — PERIOPERATIVE NURSING NOTE
Patient arrives from OR with anesthesia at bedside and OR staff. Report received. Patient A&Ox4, pain 4/10 (Ice machine applied), VSS, SCD applied. Family updated.

## 2024-02-13 NOTE — PROGRESS NOTES
Physical Therapy    Physical Therapy Evaluation & Treatment    Patient Name: Mera Ansari  MRN: [id number]  Today's Date: 2/13/2024   Time Calculation  Start Time: 1405  Stop Time: 1523  Time Calculation (min): 78 min    Assessment/Plan   PT Assessment  PT Assessment Results: Decreased strength, Decreased endurance, Impaired balance, Decreased mobility, Orthopedic restrictions, Pain  Rehab Prognosis: Excellent  Barriers to Discharge: patient with LOC and subsequent emesis in supine.  Evaluation/Treatment Tolerance: Treatment limited secondary to medical complications (Comment)  Medical Staff Made Aware: Yes  Strengths: Ability to acquire knowledge, Access to adaptive/assistive products, Attitude of self, Capable of completing ADLs semi/independent, Coping skills, Insight into problems, Leisure activity, Physical health, Premorbid level of function, Rehab experience, Support and attitude of living partners, Support of extended family/friends  Barriers to Participation: Comorbidities, Housing layout  End of Session Communication: Bedside nurse, Physician (OT notified; Ortho Navigator Juan Manuel Mejias aware.)  Assessment Comment: Morena is a delightful 83 y.o. female, seen for PT eval POD #0 s/p R KENNY with WBAT and posterior R KENNY precautions.  She demonstrated impairments with mobility, AROM/strength, balance, and activity tolerance this date.  She was mobilizing with min A for bed mobility and progression from min A to CGA for transfers and gait.  Unfortunately, pt sustained an abrupt LOC during mobility and required dependent return to supine with ongoing medical workup with Dr. Cunningham.  She will benefit from ongoing PT interventions in house and LOW intensity PT, along with family A/S at time of medical DC.  End of Session Patient Position: On cart, Alarm off, not on at start of session, Alarm off, caregiver present   IP OR SWING BED PT PLAN  Inpatient or Swing Bed: Inpatient  PT Plan  Treatment/Interventions: Bed  "mobility, Transfer training, Gait training, Stair training, Balance training, Strengthening, Endurance training, Therapeutic exercise, Therapeutic activity, Home exercise program, Positioning, Postural re-education  PT Plan: Skilled PT  PT Frequency: BID  PT Discharge Recommendations: Low intensity level of continued care  Equipment Recommended upon Discharge:  (pt has FWW and cane available)  PT Recommended Transfer Status: Assist x1, Assistive device  PT - OK to Discharge: Yes (per POC)      Subjective     General Visit Information:  General  Reason for Referral: impaired mobility s/p R KENNY (POD #0)  Referred By: DO Ruslan Resident  Past Medical History Relevant to Rehab: OA, HLD, [place], chronic discoid lupus, L breast lumpectomy, migraines, TIA, osteopenia, syncope (vasovagal) - per pt and family this occurs at most once per year (even while hiking)  Family/Caregiver Present: Yes  Caregiver Feedback:  Kvng and Son Peter present and supportive, very engaged in education provided. (patient's two other sons will be providing support for pt upon DC.)  Prior to Session Communication: Bedside nurse  Patient Position Received: Bed, 2 rail up, On cart, Alarm off, not on at start of session, Alarm off, caregiver present (RN at desk)  Preferred Learning Style: kinesthetic, verbal, visual  General Comment: Patient very pleasant and cooperative - goes by \"Morena\". Motivated to return to her active lifestyle.  SCD, cryo cube, PIV heplocked.  Patient with  and son at bedside. Pt completed preop education and had excellent questions for this author throughout.  Home Living:  Home Living  Type of Home: House  Lives With: Spouse  Home Adaptive Equipment: Walker rolling or standard, Cane, Reacher, Sock aid  Home Layout: Two level, Bed/bath upstairs, Full bath main level, Stairs to alternate level with rails  Alternate Level Stairs-Rails: Left  Alternate Level Stairs-Number of Steps: 12  Home Access: Stairs to enter " without rails  Entrance Stairs-Number of Steps: 1  Bathroom Shower/Tub: Walk-in shower, Tub/shower unit  Bathroom Toilet: Handicapped height  Bathroom Accessibility: has a walk in shower on the first level available. tub/shower upstairs  Home Living Comments: patient will have support of her  and sons following discharge  Prior Level of Function:  Prior Function Per Pt/Caregiver Report  Level of [place]: Independent with ADLs and functional transfers, Independent with homemaking with ambulation  ADL Assistance: Independent  Homemaking Assistance: Independent  Ambulatory Assistance: Independent  Leisure: hiking club, tennis club, reading club, traveling  Prior Function Comments: patient is very active at baseline. reports progressive impact of R hip pain on her mobility and activity level, especially since 12/1/23, precipitating the need for R KENNY.  Precautions:  Precautions  LE Weight Bearing Status: Weight Bearing as Tolerated  Medical Precautions: Fall precautions  Post-Surgical Precautions: Right hip precautions  Precautions Comment: introduced and review R KENNY precautions with pt and family; provided written handout packet for subsequent review.  Vital Signs:  Vital Signs  Heart Rate: 64  Heart Rate Source: Monitor  BP: 123/79  MAP (mmHg): 89  BP Location: Left arm  BP Method: Automatic  Patient Position: Sitting (stable with standing EOB.)    Objective   Pain:  Pain Assessment  Pain Assessment: 0-10  Pain Score: 5 - Moderate pain  Pain Type: Surgical pain  Pain Location: Hip  Pain Orientation: Right  Pain Descriptors: Aching, Sore  Pain Frequency: Constant/continuous  Pain Onset: Ongoing  Clinical Progression: Not changed  Pain Interventions: Medication (See MAR), Cold applied, Repositioned, MD notified (Comment), Ambulation/increased activity, Distraction, Elevated, Relaxation technique, Rest  Response to Interventions: PT to tolerance. Pt reported pain remained 5/10 throughout session. RN  premedicated pt for pain.  Cognition:  Cognition  Overall Cognitive Status: Within Functional Limits  Processing Speed: Within funtional limits    General Assessments:  General Observation  General Observation: patient compelted HEP, transfers, and progressive mobility with this author to the bathroom. during ambulation out of bathroom, pt reported feeling lightheaded. PT encouraged pt to stand and pause; CLARITA Swift notified and present to assist approx 1450. Pt then with almost immediate LOC approx 1452. dependent A with standing and care for RLE alignment to prevent dislocation. pt dependently transferred to sit in wheelchair with physical support of PT and rapid response called. Patient returned to cart with dependent assist of PT and PACU staff, with immediate return of consciousness. A&Ox4 and neuro checks intact.  Patient rated pain 5/10. Subsequent BP at 1455 167/70, SpO2 97%, HR 66.  Dr. Cunningham present at bedside to assess pt at 1457. Subsequent /83 in supine.  Pt's son and  present and provided encouragement.               Activity Tolerance  Endurance: Other (Comment) (patient with good endurance, however displayed LOC with ambulation (likely vasovagal event))  Early Mobility/Exercise Safety Screen: Proceed with mobilization - No exclusion criteria met    Sensation  Light Touch: No apparent deficits    Strength  Strength Comments: WFL BUE and LLE; RLE with mild impairment but grossly >3/5 and min A to CGA for therex.  Strength  Strength Comments: WFL BUE and LLE; RLE with mild impairment but grossly >3/5 and min A to CGA for therex.           Coordination  Movements are Fluid and Coordinated: Yes    Postural Control  Postural Control: Within Functional Limits    Static Sitting Balance  Static Sitting-Balance Support: No upper extremity supported, Feet supported  Static Sitting-Level of Assistance: Close supervision, Distant supervision  Static Sitting-Comment/Number of Minutes: >5 minutes  EOB  Dynamic Sitting Balance  Dynamic Sitting-Balance Support: Feet supported, No upper extremity supported  Dynamic Sitting-Balance: Lateral lean, Forward lean, Reaching for objects, Trunk control activities  Dynamic Sitting-Comments: SBA    Static Standing Balance  Static Standing-Balance Support: Bilateral upper extremity supported  Static Standing-Level of Assistance: Minimum assistance, Contact guard  Static Standing-Comment/Number of Minutes: with FWW, >3 minutes  Dynamic Standing Balance  Dynamic Standing-Balance Support: Bilateral upper extremity supported  Dynamic Standing-Balance:  (ambulating)  Dynamic Standing-Comments: with FWW and min A to CGA  Functional Assessments:  Bed Mobility  Bed Mobility: Yes  Bed Mobility 1  Bed Mobility 1: Supine to sitting  Level of Assistance 1: Set up, Minimum assistance, Moderate verbal cues, Moderate tactile cues  Bed Mobility Comments 1: HOB elevated (physical support for RLE and cues for LLE in hooklying, UE support and lateral trunk excursion toward EOB prior to completing sitting, with cues for adhering to KENNY precautions.)  Bed Mobility 2  Bed Mobility  2: Scooting  Level of Assistance 2: Set up, Close supervision, Minimal verbal cues, Minimal tactile cues  Bed Mobility Comments 2: reciprocal scooting to EOB  Bed Mobility 3  Bed Mobility 3: Sitting to supine  Level of Assistance 3: Dependent  Bed Mobility Comments 3: pt with (+) LOC - dependent reutrn to supine on cart. immediate awakening and A&Ox4, no adverse events or responses. able to assist with lateral transfer to bed from cart and reposition.    Transfers  Transfer: Yes  Transfer 1  Technique 1: Sit to stand  Transfer Device 1: Walker, Gait belt  Transfer Level of Assistance 1: Set up, Minimum assistance, Moderate verbal cues, Moderate tactile cues  Transfers 2  Technique 2: Stand to sit (on commode (regular surface height))  Transfer Device 2: Walker  Transfer Level of Assistance 2: Set up, Moderate verbal  cues, Moderate tactile cues, Contact guard  Trials/Comments 2: patient provided with cues for RLE positioning/advancement, use of RUE support on grabbar and physical support to lower onto toilet.  Transfers 3  Technique 3: Sit to stand  Transfer Device 3: Walker  Transfer Level of Assistance 3: Set up, Moderate verbal cues, Moderate tactile cues, Contact guard  Trials/Comments 3: completed from toilet with cues for RLE positioning, use of grabbar with RUE support and for sequencing.  Transfers 4  Transfer From 4: Wheelchair to  Transfer to 4: Bed  Technique 4: Lateral  Transfer Device 4: Gait belt  Transfer Level of Assistance 4: Dependent, Set up  Trials/Comments 4: pt dependently and safely transferred from wheelchair to cart in PACU 2/2 LOC.  Transfers 5  Technique 5: Lateral  Transfer Device 5:  (draw sheet)  Transfer Level of Assistance 5: Moderate assistance, +2  Trials/Comments 5: patient performed lateral transfer and repositioning from one cart to bed support with draw sheet, cues for maintaining RLE alignment.  Transfers 6  Trials/Comments 6: supine repositioning in bed with HOB elevated (d/t patient with rapid onset of nausea)    Ambulation/Gait Training  Ambulation/Gait Training Performed: Yes  Ambulation/Gait Training 1  Surface 1: Level tile  Device 1: Rolling walker  Gait Support Devices: Gait belt  Assistance 1: Contact guard, Moderate verbal cues, Moderate tactile cues  Quality of Gait 1: Narrow base of support, Diminished heel strike, Inconsistent stride length, Decreased step length, Antalgic (increased BUE support)  Comments/Distance (ft) 1: 5 feet with step to pattern (very small steps with elevated UEs, tense. multimodal cues to correct and progress pt.)  Ambulation/Gait Training 2  Surface 2: Level tile  Device 2: Rolling walker  Gait Support Devices: Gait belt  Assistance 2: Contact guard, Moderate verbal cues, Moderate tactile cues  Quality of Gait 2: Narrow base of support, Diminished heel  strike, Inconsistent stride length, Decreased step length, Antalgic (occasional in-toeing of RLE with cues to correct. emphasis on step to pattern with walker positioning reviewed. (pt has never used FWW previously.))  Comments/Distance (ft) 2: 30 feet  Ambulation/Gait Training 3  Surface 3: Level tile  Device 3: Rolling walker  Gait Support Devices: Gait belt  Assistance 3: Contact guard, Moderate verbal cues, Minimal tactile cues  Quality of Gait 3: Diminished heel strike, Decreased step length, Antalgic  Comments/Distance (ft) 3: 15 feet and 3 feet - ambulating out of bathroom with pt's sudden report of lightheadedness with almost immediate LOC. pt then dependent A to stand and maintain upright x2-3 minutes    Stairs  Stairs: No (deferred 2/2 LOC)  Extremity/Trunk Assessments:  Cervical Spine   Cervical Spine: Within Functional Limits  Lumbar Spine   Lumbar Spine : Within Functional Limits    RUE   RUE : Within Functional Limits  LUE   LUE: Within Functional Limits  RLE   RLE : Exceptions to WFL (postop but >3/5 strength; KENNY precautions.)  LLE   LLE : Within Functional Limits  Treatments:  Therapeutic Exercise  Therapeutic Exercise Performed: Yes  Therapeutic Exercise Activity 1: reviewed KENNY exercises on RLE (AP, QS, GS, HS, ABD, SAQ, LAQ) x12. Patient exhibited mildly impaired initial hip flexor/R quad strength with exercises with cues for sequencing and alignment. PT provided pt with cues/demo for purse lip/paired breathing throughout all mobility as pt with tendency to hold her breath.    Therapeutic Activity  Therapeutic Activity Performed: Yes  Therapeutic Activity 1: education re: gait and transfer training. review of HEP. discussed elevation of hips in sitting position to provide mechanical advantage and emphasis on RLE position/advancement to decrease WBing/pain with sit to stand. use of closed back shoes and/or  socks for improved safety.  Instruction for posterior KENNY precautions throughout  session concurrent with all mobility/activity. discussed use of ice (cryo vs. ice packs and recommended duration, inspection of skin integrity, etc). encouraged pt to mobilize once per hour with FWW and family A/S PRN upon return home to prevent siffness; complete HEP 3x/day.  Therapeutic Activity 2: assisted pt with dynamic sitting balance to harley undergarments while sitting on commode prior to additoinal mobility, cues for donning on RLE first and then LLE. PT assisted pt to stand with CGA using grabbar from low surface and provided physical support while pt utilized single UE support to pull up undergarments.  Therapeutic Activity 3: patient with questions regarding sleeping positions in respect to KENNY precautions. provided with instruction for pillow support in order to maintain alignment; educated pt to sleep on non-operative side.  Therapeutic Activity 4: verbal review of stair negotiation completed as pt reports completing step to pattern to ascend and reciprocal pattern to descend at baseline. stair trial deferred this date 2/2 LOC. education to pt and family for caregiver presence with stair negotiation and to minimize the frequency of stair negotiation initially.    Balance/Neuromuscular Re-Education  Balance/Neuromuscular Re-Education Activity Performed: Yes  Balance/Neuromuscular Re-Education Activity 1: static standing at EOB with initial ~60-70% WBing on LLE with wide stance, FWW support and gait belt, min A quickly decreasing to CGA x1 minute. progressive correction of [place] and posture with FWW prior to completing steps and mobility to allow for more equal WBing and neutral posture with CGA support.  Balance/Neuromuscular Re-Education Activity 2: CGA with dynamic standing with single UE support completing hygiene in bathroom with cues for RLE alignment, walker positioning, and adherence to KENNY precautions. good carryover. maintained balance x2 minutes.    Bed Mobility  Bed Mobility: Yes  Bed Mobility  1  Bed Mobility 1: Supine to sitting  Level of Assistance 1: Set up, Minimum assistance, Moderate verbal cues, Moderate tactile cues  Bed Mobility Comments 1: HOB elevated (physical support for RLE and cues for LLE in hooklying, UE support and lateral trunk excursion toward EOB prior to completing sitting, with cues for adhering to KENNY precautions.)  Bed Mobility 2  Bed Mobility  2: Scooting  Level of Assistance 2: Set up, Close supervision, Minimal verbal cues, Minimal tactile cues  Bed Mobility Comments 2: reciprocal scooting to EOB  Bed Mobility 3  Bed Mobility 3: Sitting to supine  Level of Assistance 3: Dependent  Bed Mobility Comments 3: pt with (+) LOC - dependent reutrn to supine on cart. immediate awakening and A&Ox4, no adverse events or responses. able to assist with lateral transfer to bed from cart and reposition.    Ambulation/Gait Training  Ambulation/Gait Training Performed: Yes  Ambulation/Gait Training 1  Surface 1: Level tile  Device 1: Rolling walker  Gait Support Devices: Gait belt  Assistance 1: Contact guard, Moderate verbal cues, Moderate tactile cues  Quality of Gait 1: Narrow base of support, Diminished heel strike, Inconsistent stride length, Decreased step length, Antalgic (increased BUE support)  Comments/Distance (ft) 1: 5 feet with step to pattern (very small steps with elevated UEs, tense. multimodal cues to correct and progress pt.)  Ambulation/Gait Training 2  Surface 2: Level tile  Device 2: Rolling walker  Gait Support Devices: Gait belt  Assistance 2: Contact guard, Moderate verbal cues, Moderate tactile cues  Quality of Gait 2: Narrow base of support, Diminished heel strike, Inconsistent stride length, Decreased step length, Antalgic (occasional in-toeing of RLE with cues to correct. emphasis on step to pattern with walker positioning reviewed. (pt has never used FWW previously.))  Comments/Distance (ft) 2: 30 feet  Ambulation/Gait Training 3  Surface 3: Level tile  Device 3:  Rolling walker  Gait Support Devices: Gait belt  Assistance 3: Contact guard, Moderate verbal cues, Minimal tactile cues  Quality of Gait 3: Diminished heel strike, Decreased step length, Antalgic  Comments/Distance (ft) 3: 15 feet and 3 feet - ambulating out of bathroom with pt's sudden report of lightheadedness with almost immediate LOC. pt then dependent A to stand and maintain upright x2-3 minutes  Transfers  Transfer: Yes  Transfer 1  Technique 1: Sit to stand  Transfer Device 1: Walker, Gait belt  Transfer Level of Assistance 1: Set up, Minimum assistance, Moderate verbal cues, Moderate tactile cues  Transfers 2  Technique 2: Stand to sit (on commode (regular surface height))  Transfer Device 2: Walker  Transfer Level of Assistance 2: Set up, Moderate verbal cues, Moderate tactile cues, Contact guard  Trials/Comments 2: patient provided with cues for RLE positioning/advancement, use of RUE support on grabbar and physical support to lower onto toilet.  Transfers 3  Technique 3: Sit to stand  Transfer Device 3: Walker  Transfer Level of Assistance 3: Set up, Moderate verbal cues, Moderate tactile cues, Contact guard  Trials/Comments 3: completed from toilet with cues for RLE positioning, use of grabbar with RUE support and for sequencing.  Transfers 4  Transfer From 4: Wheelchair to  Transfer to 4: Bed  Technique 4: Lateral  Transfer Device 4: Gait belt  Transfer Level of Assistance 4: Dependent, Set up  Trials/Comments 4: pt dependently and safely transferred from wheelchair to cart in PACU 2/2 LOC.  Transfers 5  Technique 5: Lateral  Transfer Device 5:  (draw sheet)  Transfer Level of Assistance 5: Moderate assistance, +2  Trials/Comments 5: patient performed lateral transfer and repositioning from one cart to bed support with draw sheet, cues for maintaining RLE alignment.  Transfers 6  Trials/Comments 6: supine repositioning in bed with HOB elevated (d/t patient with rapid onset of nausea)  Outcome  Measures:  [facility] Basic Mobility  Turning from your back to your side while in a flat bed without using bedrails: A little  Moving from lying on your back to sitting on the side of a flat bed without using bedrails: A little  Moving to and from bed to chair (including a wheelchair): A little  Standing up from a chair using your arms (e.g. wheelchair or bedside chair): A little  To walk in hospital room: A little  Climbing 3-5 steps with railing: Total  Basic Mobility - Total Score: 16    Encounter Problems       Encounter Problems (Active)       PT Problem       PT Goal 1 - bed mobility        Start:  02/13/24    Expected End:  02/16/24       Pt will complete bed mobility with HOB flat, Mod I with adherence to precautions.           PT Goal 2 - transfers       Start:  02/13/24    Expected End:  02/16/24       1) Patient will complete all transfers with mod I and LRAD, adhering to KENNY precautions independently, from all surfaces/heights.  2) Patient will complete car transfer with proper sequencing and SBA, adhering to KENNY precautions.            PT Goal 3 - gait       Start:  02/13/24    Expected End:  02/16/24       Patient will ambulate >250 feet with LRAD and mod I, using reciprocal steady pattern, and adhering to KENNY precautions.          PT Goal 4 - stairs       Start:  02/13/24    Expected End:  02/16/24       1) Patient will ascend/descend 1 entry stair with cane support and SBA, with adherence to precautions and safe technique.  2) Patient will ascend/descend 12 stairs with SBA, HR on L and SPC, demonstrating safe technique and adhering to KENNY precautions            PT Goal 5 - HEP and strength       Start:  02/13/24    Expected End:  02/16/24       1) Patient will demonstrate independence with KENNY HEP x20 reps with proper sequencing.  2) Patient will demonstrate >4/5 strength and independent RLE activation with activity and therex.                 Education Documentation  Handouts, taught by Bonnie Reece,  PT at 2/13/2024  5:34 PM.  Learner: Family, Patient  Readiness: Acceptance  Method: Explanation, Demonstration, Handout  Response: Demonstrated Understanding, Verbalizes Understanding  Comment: Extensive time spent educating pt and her family regarding postop mobility, HEP, safety, precautions, and plan for progressive mobility with PT as pt is stable.    Precautions, taught by Bonnie Reece PT at 2/13/2024  5:34 PM.  Learner: Family, Patient  Readiness: Acceptance  Method: Explanation, Demonstration, Handout  Response: Demonstrated Understanding, Verbalizes Understanding  Comment: Extensive time spent educating pt and her family regarding postop mobility, HEP, safety, precautions, and plan for progressive mobility with PT as pt is stable.    Body Mechanics, taught by Bonnie Reece PT at 2/13/2024  5:34 PM.  Learner: Family, Patient  Readiness: Acceptance  Method: Explanation, Demonstration, Handout  Response: Demonstrated Understanding, Verbalizes Understanding  Comment: Extensive time spent educating pt and her family regarding postop mobility, HEP, safety, precautions, and plan for progressive mobility with PT as pt is stable.    Home Exercise Program, taught by Bonnie Reece PT at 2/13/2024  5:34 PM.  Learner: Family, Patient  Readiness: Acceptance  Method: Explanation, Demonstration, Handout  Response: Demonstrated Understanding, Verbalizes Understanding  Comment: Extensive time spent educating pt and her family regarding postop mobility, HEP, safety, precautions, and plan for progressive mobility with PT as pt is stable.    Mobility Training, taught by Bonnie Reece PT at 2/13/2024  5:34 PM.  Learner: Family, Patient  Readiness: Acceptance  Method: Explanation, Demonstration, Handout  Response: Demonstrated Understanding, Verbalizes Understanding  Comment: Extensive time spent educating pt and her family regarding postop mobility, HEP, safety, precautions, and plan for progressive mobility with PT  as pt is stable.    Education Comments  No comments found.

## 2024-02-13 NOTE — ANESTHESIA POSTPROCEDURE EVALUATION
Patient: Mera Ansari    Procedure Summary       Date: 02/13/24 Room / Location: [facility] / [facility]    Anesthesia Start: 0830 Anesthesia Stop: 1030    Procedure: Right Hip Total Arthroplasty (Right: Hip) Diagnosis:       Unilateral primary osteoarthritis, right hip      (Unilateral primary osteoarthritis, right hip [M16.11])    Surgeons: Danie Macias MD Responsible Provider: Stacy Morrell MD    Anesthesia Type: spinal ASA Status: 2            Anesthesia Type: spinal    Vitals Value Taken Time   /85 02/13/24 1045   Temp 36 °C (96.8 °F) 02/13/24 1030   Pulse 67 02/13/24 1045   Resp 14 02/13/24 1045   SpO2 98 % 02/13/24 1045       Anesthesia Post Evaluation    Patient location during evaluation: PACU  Patient participation: complete - patient participated  Level of consciousness: awake and alert  Pain score: 2  Pain management: adequate  Airway patency: patent  Cardiovascular status: stable  Respiratory status: spontaneous ventilation  Hydration status: acceptable  Postoperative Nausea and Vomiting: none        There were no known notable events for this encounter.

## 2024-02-13 NOTE — PROGRESS NOTES
Rapid response evaluation note    Rapid response called for syncopal episode on this patient.  Patient was ambulating to the bathroom and when she had a syncopal episode.  Fortunately RN was with her and able to catch her.  Did not fall or hit her head.  Denies preceding chest pain or shortness of breath.  No palpitations.  Currently does not have chest pain or shortness of breath.  States she felt lightheaded before she passed out.  This was the first time she had gotten out of bed and put weight on her foot after her right hip arthroplasty this morning. Prior to this episode orthostatic vitals were checked by the RN and reportedly negative.  She reports she has history of vasovagal syncope.  This appears to be a vasovagal event.  She did have an episode of emesis.  Will give small fluid bolus and as needed Zofran.     Her orthopedic surgeon was notified regarding the events.  Will defer decision on observing overnight versus discharge home to orthopedic surgery.     Discussed with patient again. She is agreeable to observation overnight and repeat therapy eval in AM prior to dc home. Will admit to my service.    Electronically signed by Jovon Cunningham DO on 02/13/24 at 3:11 PM

## 2024-02-13 NOTE — ANESTHESIA PROCEDURE NOTES
Spinal Block    Patient location during procedure: OR  Start time: 2/13/2024 8:35 AM  End time: 2/13/2024 8:40 AM  Reason for block: primary anesthetic  Staffing  Performed: attending   Authorized by: Stacy Morrell MD    Performed by: Stacy Morrell MD    Preanesthetic Checklist  Completed: patient identified, IV checked, risks and benefits discussed, surgical consent, monitors and equipment checked, pre-op evaluation, timeout performed and sterile techniques followed  Block Timeout  RN/Licensed healthcare professional reads aloud to the Anesthesia provider and entire team: Patient identity, procedure with side and site, patient position, and as applicable the availability of implants/special equipment/special requirements.  Patient on coagulant treatment: no  Timeout performed at: 2/13/2024 8:34 AM  Spinal Block  Patient position: sitting  Prep: Betadine  Sterility prep: cap, gloves, drape, hand hygiene and mask  Sedation level: light sedation  Patient monitoring: blood pressure and continuous pulse oximetry  Approach: midline  Vertebral space: L3-4  Injection technique: single-shot  Needle  Needle type: pencil-point   Needle gauge: 24 G  Needle length: 5 cm  Free flowing CSF: yes    Assessment  Sensory level: T10 bilateral  Block outcome: patient comfortable  Procedure assessment: patient tolerated procedure well with no immediate complications  Additional Notes  1.5% preservative free isobaric Mepivacaine 2.5 ml used for spinal anesthesia.

## 2024-02-13 NOTE — OP NOTE
Right Hip Total Arthroplasty (R) Operative Note     Date: 2024  OR Location: [facility] OR    Name: Mera Ansari, : 1940, Age: 83 y.o., MRN: [id number], Sex: female    Diagnosis  Pre-op Diagnosis     * Unilateral primary osteoarthritis, right hip [M16.11] Post-op Diagnosis     * Unilateral primary osteoarthritis, right hip [M16.11]     Procedures  Right Hip Total Arthroplasty  90715 - HI ARTHRP ACETBLR/PROX FEM PROSTC AGRFT/ALGRFT      Surgeons      * Danie Macias - Primary    Resident/Fellow/Other Assistant:  Surgeon(s) and Role:     * DO Carola Tate Resident - Assisting    Procedure Summary  Anesthesia: Consult  ASA: II  Anesthesia Staff: Anesthesiologist: Stacy Morrell MD  CRNA: LAINEY Aguilar-CRNA  Estimated Blood Loss: 100mL  Intra-op Medications: Administrations occurring from 0830 to 1100 on 24:  * No intraprocedure medications in log *           Anesthesia Record               Intraprocedure I/O Totals          Intake    Tranexamic Acid 0.00 mL    The total shown is the total volume documented since Anesthesia Start was filed.    Propofol Drip 50.00 mL    The total shown is the total volume documented since Anesthesia Start was filed.    LR infusion 1100.00 mL    Total Intake 1150 mL       Output    Est. Blood Loss 100 mL    Total Output 100 mL       Net    Net Volume 1050 mL          Specimen: No specimens collected     Staff:   Circulator: Chloé Womack RN  Relief Circulator: Vargas Chacko RN  Scrub Person: Won Ann         Drains and/or Catheters: * None in log *    Tourniquet Times:         Implants:  Implants       Type Name Action Serial No.      Joint Hip ACETABULAR CUP, SECTOR, GRIPTON, SIZE 52MM - SNA - [id number] Implanted NA     Screw SCREW CANCELLOUS 6.5 X 25 - SNA - [id number] Implanted NA     Joint Hip pinnacle altrx polyethylene acetabular liner +4 10 degree 36mm Implanted NA     Joint Hip HIP BALL ARTIC/SAMRA 36+5 - SNA - [id number] Implanted NA     Joint  Hip STEM, FEMORAL, ACTIS COLLAR, STD, SIZE 6 - SNA - [id number] Implanted NA              Findings:djd    Indications: Mera Ansari is an 83 y.o. female who is having surgery for Unilateral primary osteoarthritis, right hip [M16.11].     The patient was seen in the preoperative area. The risks, benefits, complications, treatment options, non-operative alternatives, expected recovery and outcomes were discussed with the patient. The possibilities of reaction to medication, pulmonary aspiration, injury to surrounding structures, bleeding, recurrent infection, the need for additional procedures, failure to diagnose a condition, and creating a complication requiring transfusion or operation were discussed with the patient. The patient concurred with the proposed plan, giving informed consent.  The site of surgery was properly noted/marked if necessary per policy. The patient has been actively warmed in preoperative area. Preoperative antibiotics have been ordered and given within 1 hours of incision. Venous thrombosis prophylaxis have been ordered including unilateral sequential compression device    Procedure Details: Preoperative huddle was performed with patient identification procedure and site verification.  Patient given prophylactic antibiotics and TXA.  After placement of spinal anesthetic she was positioned in the lateral decubitus position.  Right hip and leg were sterilely prepped and draped in usual fashion, ChloraPrep solution was utilized as well as a Betadine impregnated drape.  After secondary timeout the skin incision was made standard posterior approach was made to the hip.  Short rotators and capsule were taken down in 1 layer and teed proximally.  Hip was dislocated.  Femoral neck cut was made using neck cutting guide.  Deep acetabular retractors were then placed.  The labrum was excised.  The acetabulum then reamed to 51 mm.  A 52 mm DePuy [place] sector GRIPTION cup was placed had good interference  fit.  1 screw was used for additional fixation.  The 10 degree extended liner was placed in the 10 o'clock position.  Next attention was paid to the femur and after preparation a size 5 DePuy Actis stem was placed had good torsional stability after trials a +536 head was utilized this was a stable construct leg lengths appeared equal.  Interoperative x-rays were taken.  The wound was dry prior to closure was irrigated and closed in layers.  Short rotators and capsule reattached to drill holes of the trochanter.  Skin was closing a running subicular suture.  Sterile dressing was placed.  Complications:  None; patient tolerated the procedure well.    Disposition: PACU - hemodynamically stable.  Condition: stable         Additional Details:     Attending Attestation: I was present for the entire procedure.    Danie Macias  Phone Number: [phone]

## 2024-02-13 NOTE — PROGRESS NOTES
Spiritual Care Visit    Clinical Encounter Type  Visited With: Patient and family together  Routine Visit: Introduction  Continue Visiting: Yes  Surgical Visit: Post-op  Crisis Visit: Trauma  Referral From: Verbal (Response to Rapid Response)  Referral To:     Assessment:  was reasonably distraught and anxious at the sight of his wife of 66 years being in distress.   Concern: The son had stepped out to answer his phone at the time of incident.  He expressed feeling of guilt for not being there, but was encouraged to attempt to understand that both he and his brothers are doing the best that they can.  Awareness: The pt and  were able to list their medical history.  Support: All three sons are a team and are there for one another and their parents.   Outcome: The caregivers were answering all questions from the family and given updates on the condition of the pt. The loving couple continued to comfort one another as well as the attentive son.  Follow up:  Follow up will be granted if requested.    Chaplain Derik Darby

## 2024-02-13 NOTE — DISCHARGE INSTRUCTIONS
Weightbearing as tolerated posterior hip precautions.    Okay to shower.  Pat dressing dry.  No soaks.    Please call with any questions or concerns.      Postoperative Instructions: Total Joint Replacement    POSTOPERATIVE MEDICATIONS  PAIN MEDICATION  Pain medications have been prescribed for post-operative pain control. Take in conjunction with ice/cold therapy to assist with swelling and pain. If prescribed multiple pain medications, be sure to alternate administration times throughout the day so that you can take something every few hours. Consider taking Tylenol in between narcotic administration times (keep in mind Percocet/Vicodin contain Tylenol and not to exceed Tylenol limit of 4grams in 24 hours). Prescription will generally be for 7 days at a time and refills will be sent upon request. For refills, request via [employer] or call surgeon's office during business hours and follow up with your pharmacy regarding status and pickup.    Side effects may be constipation and nausea, vomiting, sleepiness, dizziness, lightheadedness, headache, blurred vision, dry mouth, sweating, itching (if you have itching, over-the -counter Benadryl can be used as needed).  You may NOT operate a motor vehicle while taking narcotic pain medication.    BLOOD THINNER  Aspirin or another medication has been prescribed as a blood thinner to prevent blood clots in your leg or lungs. Take as prescribed on the bottle. You will not receive a refill on this medication.  Do not take this medication if you are on another blood thinner.  Do not take any anti-inflammatory medications such as Meloxicam, Celebrex, Ibuprofen, Motrin, Aleve, or Advil while using the Aspirin or another blood thinner unless instructed otherwise by your surgeon.       STOOL SOFTENERS/LAXATIVES  Post-operative constipation can result due to a combination of inactivity, anesthesia and pain medication. To help prevent this, you should increase your water and fiber  intake. Physical activity, such as walking, will also help stimulate the bowels. If you are not having regular bowel movements, increase your bowel regimen!  Consider constipation prevention and treatment medications if not prescribed by your surgeon. These are available over the counter at the drug store (The pharmacist on staff may also make recommendations):  Stool Softener: Colace  Laxative: Senna, Miralax, Milk of Magnesia, Magnesium Citrate    WOUND CARE  Pain and swelling are normal following surgery and can last for weeks to months depending on the patient.  To help relieve these symptoms, please follow the post-operative pain regimen as it has been prescribed, use ice often, wear compression stockings every day as prescribed, and elevate your leg every hour.   You have a waterproof bandage on your wound and may shower with this on. The waterproof bandage is to remain in place for a minimum of 6 -7 days. Home care will remove it. You may leave your incision open to air after the bandage has been removed. Once the dressing is removed, you may see steri strips, surgical mesh, or glue. Do not peel or cut any of these items, they will fall off on their own or your surgeon will address at your follow up appointment.   DO NOT soak your incision in a bath, hot tub, pool or pond/lake for a minimum of 8 weeks following your surgery.  DO NOT use lotions, creams, ointments on your wound for a minimum of 6 weeks following your surgery. At that time you may use vitamin E to assist with softening of your incision.    _____ TOTAL HIP ARTHROPLASTY  After surgery, you will have a compression stocking on your operative leg. Continue to wear the compression stocking for 4 weeks to prevent blood clots in your leg or lungs. Remove compression stockings at night.  If there is continued drainage or bleeding, cover with an abdominal pad and tape.   posterior hip precautions:   Don't lean forward while you sit down or stand up, and  "don't bend past 90 degrees (like the angle in a letter \"L\"). This means you can't try to  something off the floor or bend down to tie your shoes. Don't lift your knee higher than your hip.  Don't sit on low chairs, beds, or toilets. You may want to use a raised toilet seat for a while. Sit in chairs with arms. Imagine there's a line running down the middle of your body. Keep your legs from crossing over it.  When you get into a car, back up to the seat of the car, and then sit and slide across the seat toward the middle of the car with your knees about 12 inches apart. A plastic bag on the seat can help you slide in and out of the car.  Don't cross your legs when you sit.  Don't cross your ankles while lying down.  It may help to keep a pillow between your knees when you're in bed.    _____ TOTAL KNEE ARTHROPLASTY  After surgery, you will have a compression stocking and ACE wrap on your operative leg. Continue to wear the compression stocking for 4 weeks to prevent blood clots in your leg or lungs. Remove compression stockings at night. ACE wrap can be removed on postop day 1 or removed by homecare therapist at their first visit. Can use ACE wrap intermittently for swelling.  Elevate your leg periodically to help with swelling. BE SURE to place the support under your ankle, NOT under your knee. You want your knee as straight as possible.    JOINT CARE TEAM  For nursing or wound care questions within the first 6 weeks after surgery, please contact the joint replacement nurse at the facility where you had surgery.  If you are leaving a message, please include your full name, date of birth and date of surgery so that we can identify correctly identify you.  For messages left outside of normal business hours, your call will be returned on the next business day.  Please do not leave emergent messages outside of normal business hours that cannot wait until the next business day.  For orthopedic concerns longer than " 6 weeks after your surgery, you will need to call the office to schedule an appointment to be seen.    [facility]:  Ruben Martini RN, [phone] or Fiona Mejias RN, [phone]        RESTARTING HOME MEDICATIONS/DIET  You may restart your home medications the following day after your surgery UNLESS you have been given alternate instructions.  Follow the instructions given to you on your hospital discharge instructions for more information regarding your home medications.  Resume your normal diet after surgery. If you are on a specific type of diet for your condition, resume that instead.  Choose foods that help promote good bowel habits and prevent constipation, such as foods high in fiber. Be sure to drink water to stay hydrated and to prevent constipation.       DENTAL PROCEDURES & CLEANINGS  All patients must wait a minimum of 3 months for elective dental appointments, including routine cleanings, as to prevent total joint replacement infections. Please refer to your surgeon as to whether you will need antibiotics for future dental appointments.     EMERGENCIES  When to contact our office immediately:  Fever >101.5 for at least 48 hours after surgery or chills.  Excessive bleeding from incision(s). A small amount of drainage is normal and expected.  Signs of infection of incision(s)-excessive drainage that is soaking through your dressing (especially if it is pus-like), redness that is spreading out from the edges of your incision, or increased warmth around the area.  Excruciating pain for which the pain medication, taken as instructed, is not helping.  Severe calf pain.  Go directly to the emergency room or call 911, if you are experiencing chest pain, shortness of breath, or difficulty breathing.    IN-HOME PHYSICAL THERAPY & OUTPATIENT PHYSICAL THERAPY  In-home physical therapy will start 1-2 days after you get home from the hospital.  The home care agency will call you prior to their first  visit.  Please refer to the contact information on your hospital discharge instructions if you need to contact them.  Make sure to provide a phone number with the ability for the home care staff to leave a message if you do not answer your phone.  Please continue to complete the assigned home exercises two times per day on the days that physical therapy is not scheduled to come to your home.    Following a total knee replacement, you should plan to transition from in-home therapy to outpatient therapy 2-3 weeks after surgery.  Patients should be making their first appointment several weeks in advance to avoid delays. You may use the physical therapy location of your choice; it is the patient's responsibility to make sure the location chosen is covered by insurance.  If you are having a hip replacement and need additional therapy, please contact the office for an order.    It is common to have a temporary increase in pain and swelling upon starting outpatient physical therapy and/or changing your exercise routine.  Continue to use ice to help with symptoms.    DRIVING & TRAVEL AFTER SURGERY   Patients should anticipate waiting at least 4-6 weeks before traveling long distances after surgery.  You will need to stop to walk around ever 1 hour during your travel to help with blood clot prevention.  Please call the office or your joint nurse to discuss prior to post-surgical travel.  Patients may not drive until cleared by the joint nurse or the office.    FOLLOW-UP APPOINTMENT  Please call your surgeon's office within 2 weeks following surgery to schedule a follow up visit.    ICE  You have been prescribed to ice your total joint at a minimum of twice per hour for 20 minutes while awake during the first 6 weeks after surgery if you are using ice packs. This will help with pain control. Be sure to have a layer of protection between the ice pack and your skin. Ice packs should be rotated on for 20 minutes and off for 20  minutes to prevent frost bite.   If you are using an ice machine, please follow ice machine instructions and tips below.    Polar Care Cold Therapy Machine Recommendations    Cold therapy devices can be used before and after surgery to assist in comfort and help to reduce pain and swelling.  These devices differ from ice or ice packs whereas the mechanism circulates water through tubing and a pad to provide longer periods of cold therapy to the desired site.  While in the hospital, you can use your cold devices around the clock for optimal comfort.  We recommend using cold therapy after working with therapy or completing exercises on your own.  Once you are discharged home, there is no set schedule in which you must follow while using cold therapy.  Below are a few points to remember when using a cold therapy device:    Read the 's instructions prior to first the use.  Follow instructions for filling the cooler (water first, then ice).  Always make sure there is a layer of protection between the cold pad and your skin (Clothing, Towel, Ace Bandage, etc.)  Allow the device to circulate cold water throughout the pad prior wrapping the pad around your leg (approximately 10 minutes).  Place the pad on your leg in the desired position to meet your pain management needs and use the wraps provided to secure the pad to your body.  The purpose of this device is to use consistently throughout the day.  You do not need to need to use the 20 on, 20 off method.  During waking hours, remove the cold pad every 1-2 hours to perform a skin check  Detach the pad from the cooler and ambulate at least once every hour  After removing the pad, allow at least 30 minutes before resuming cold therapy  You may wear the cold therapy device during periods of sleep including overnight    If you wake up during the night, you can check the skin at this time.  You do not need to wake up specifically to perform skin checks.  Empty the  cooler and pad when device is not in use.  Follow 's instructions for cleaning your cold therapy device.    [facility] can assist with problems related to products purchased through the [facility]  [phone] - [place]   or   [phone] - [place]

## 2024-02-13 NOTE — POST-PROCEDURE NOTE
Pt to POD area 1241.  Family to bedside 1245.     Pt tolerating food and water 1257  Pt reporting 5/10 pain, pt stating pain is tolerable and does not want to take more medication at this time.    Pt urinated on bed jama. 1320.    Bonnie Dukes called for PT 1320.    Bonnie Dukes at bedside 1409    Rapid response called on patient due to vasovagal syncope in bathroom with Bonnie VELAZQUEZ. Code documentation completed in note.     Pt admitted under Dr. Cunningham's hospitalist service for obs stay over night.     Pt in bed comfortable VSS 1520.

## 2024-02-13 NOTE — ANESTHESIA PREPROCEDURE EVALUATION
Patient: Mera Ansari    Procedure Information       Date/Time: 02/13/24 0830    Procedure: Right Hip Total Arthroplasty (Right: Hip)    Location: [facility] OR  / [facility] OR    Surgeons: Danie Macias MD                                                    Pre-Anesthesia Evaluation      Mera Ansari is a 83 y.o. female who presents for procedure stated above.     Medical History reviewed  Past Medical History:   Diagnosis Date    Chronic discoid lupus erythematosus     scalp    Hearing aid worn     HL (hearing loss)     Hyperlipidemia     Hypothyroidism     Migraine     Neuralgia     OA (osteoarthritis)     Osteopenia     Syncope, vasovagal     no episodes recently    TIA (transient ischemic attack) 2020    possible TIA vs ocular migraine, no residual deficit    Vitiligo     chest     Surgical History reviewed   Past Surgical History:   Procedure Laterality Date    BREAST LUMPECTOMY Left 1992    benign    CATARACT EXTRACTION Bilateral     COLONOSCOPY      CT ANGIO NECK  05/29/2020    CT NECK ANGIO W AND WO IV [place] [facility]    CT HEAD ANGIO W AND WO IV CONTRAST  05/27/2020    CT HEAD ANGIO W AND WO IV CONTRAST [place]27/[place] [facility] [place] [facility]    ELBOW FRACTURE SURGERY Left 2014    SOFT TISSUE CYST EXCISION Left     histoplasmosis, 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    TONSILLECTOMY      as a child    TUBAL LIGATION  1970       Social History reviewed  She reports that she has never smoked. She has never used smokeless tobacco. She reports that she does not currently use alcohol after a past usage of about 7.0 standard drinks of alcohol per week. She reports that she does not use drugs.    Allergies and Medications reviewed  Bee pollen, Cat dander, Cat hair standardized allergenic extract, and Dog dander    Current Outpatient Medications   Medication Instructions    acetaminophen (TYLENOL) 500 mg, oral, Daily PRN    aspirin 81 mg, oral, 2 times daily, After this may resume home daily aspirin  "regimen.    atorvastatin (Lipitor) 10 mg tablet TAKE 1 TABLET DAILY AS DIRECTED    chlorhexidine (Peridex) 0.12 % solution 15 ml swish and spit for 30 seconds night prior to surgery and morning of surgery    cholecalciferol (Vitamin D-3) 25 MCG (1000 UT) tablet 1 tablet, oral, Daily    docosahexaenoic acid/epa (FISH OIL ORAL) oral    docusate sodium (COLACE) 100 mg, oral, 2 times daily PRN    docusate sodium (COLACE) 200 mg, oral, 2 times daily PRN    ibuprofen 400 mg, oral, Every 8 hours PRN    ketorolac (TORADOL) 10 mg, oral, Every 8 hours PRN    levothyroxine (Synthroid, Levoxyl) 88 mcg tablet TAKE 1 TABLET DAILY AS DIRECTED    oxyCODONE (ROXICODONE) 5 mg, oral, Every 6 hours PRN    traMADol (ULTRAM) 50 mg, oral, Every 8 hours PRN    ubidecarenone (COENZYME Q10, BULK, MISC) oral       Relevant Results reviewed  Lab Results   Component Value Date    STAPHMRSASCR No Staphylococcus aureus isolated 02/06/2024     No results found for: \"ABO\"  No results found for: \"ABORH\"      Lab Results   Component Value Date    WBC 6.8 02/06/2024    HGB 13.6 02/06/2024    HCT 41.3 02/06/2024     02/06/2024       Chemistry    Lab Results   Component Value Date/Time     02/06/2024 1359    K 4.0 02/06/2024 1359     02/06/2024 1359    CO2 28 02/06/2024 1359    BUN 17 02/06/2024 1359    CREATININE 0.80 02/06/2024 1359    Lab Results   Component Value Date/Time    CALCIUM 9.4 02/06/2024 1359    ALKPHOS 53 05/31/2023 0707    AST 22 05/31/2023 0707    ALT 15 05/31/2023 0707    BILITOT 0.8 05/31/2023 0707          No results found for: \"PROTIME\", \"INR\", \"PTT\"       Past Cardiology Tests (Last 3 Years):  EKG:  Results for orders placed in visit on 02/06/24    ECG 12 lead (Clinic Performed)    Narrative  Normal sinus rhythm  Possible Left atrial enlargement  ST abnormality, possible digitalis effect  Abnormal ECG  When compared with ECG of 05-FEB-2020 14:55,  No significant change was found  Confirmed by Shree Reis ([id number]) " "on 2/6/2024 3:40:08 PM    Echo:  No results found for this or any previous visit.    No results found for this or any previous visit.  Ejection Fractions:  No results found for: \"EF\"  Cath:  No results found for this or any previous visit.    Stress Test:  XR hip right with pelvis when performed 2 or 3 views 02/06/2024    Narrative  Interpreted By:  Nicolasa Alonso,  STUDY:  Single view pelvis.  Right hip, two views.    INDICATION:  Signs/Symptoms:m17.11.    COMPARISON:  None.    ACCESSION NUMBER(S):  [id number]    ORDERING CLINICIAN:  JOSE R WALKER    FINDINGS:  No acute fracture or malalignment.  Severe right hip osteoarthrosis with joint space loss and osteophytes.  Left hip joint space is well maintained.  Lower lumbar facet arthropathy at L4-5 and L5-S1.  Soft tissues are within normal limits.    Impression  1. Severe right hip osteoarthrosis.  2. Lower lumbar spine facet joint arthropathy.    MACRO:  None.    Signed by: Nicolasa Alonso 2/7/2024 6:32 PM  Dictation workstation:   [id number]      XR hip right with pelvis when performed 2 or 3 views 12/07/2023    Narrative  Interpreted By:  Nicolasa Alonso,  STUDY:  Single view pelvis.  Right hip, two views.    INDICATION:  Signs/Symptoms:Progressive worsening pain of the right gluteal and  right hip pain radiating to the inguinal region.    COMPARISON:  None.    ACCESSION NUMBER(S):  [id number]    ORDERING CLINICIAN:  LUPE COBOS    FINDINGS:  No acute fracture or malalignment.  Moderate to severe right hip osteoarthrosis with joint space loss and  osteophytes. Mild left hip osteoarthrosis with osteophytes.  Soft tissues are within normal limits.    Impression  1. Moderate to severe right hip osteoarthrosis.  2. Mild left hip osteoarthrosis.    MACRO:  None.    Signed by: Nicolasa Alonso 12/8/2023 4:01 PM  Dictation workstation:   [id number]    No image results found.    Vitals  Visit Vitals  /79   Pulse 67   Temp 36.1 °C (97 °F)   Resp 16 "   Wt 51.4 kg (113 lb 5.1 oz)   SpO2 98%   BMI 20.33 kg/m²   OB Status Postmenopausal   Smoking Status Never   BSA 1.51 m²                         Relevant Problems   Cardiovascular   (+) Atherosclerosis of both carotid arteries   (+) Hyperlipidemia   (+) Mixed hyperlipidemia      Endocrine   (+) Hypothyroidism   (+) Hypothyroidism, adult      Neuro/Psych   (+) Atherosclerosis of both carotid arteries      Musculoskeletal   (+) Disc degeneration, lumbosacral   (+) Osteoarthritis of finger of right hand   (+) Osteoarthritis, hand   (+) Unilateral primary osteoarthritis, right hip      Eyes, Ears, Nose, and Throat   (+) Bilateral sensorineural hearing loss      Infectious Disease   (+) URI with cough and congestion       Clinical information reviewed:   Tobacco  Allergies  Meds   Med Hx  Surg Hx   Fam Hx  Soc Hx        NPO Detail:  NPO/Void Status  Date of Last Liquid: 02/12/24  Time of Last Liquid: 1900  Date of Last Solid: 02/12/24  Time of Last Solid: 1830         Physical Exam    Airway  Mallampati: IV  TM distance: >3 FB  Neck ROM: full     Cardiovascular   Rhythm: regular     Dental   Comments: Upper permanent bridge   Pulmonary   Comments: Non labored respiration   Abdominal            Anesthesia Plan    History of general anesthesia?: yes  History of complications of general anesthesia?: no    ASA 2     spinal     Postoperative administration of opioids is intended.  Anesthetic plan and risks discussed with patient.  Use of blood products discussed with patient who consented to blood products.    Plan discussed with CRNA and CAA.

## 2024-02-14 ENCOUNTER — PHARMACY VISIT (OUTPATIENT)
Dept: PHARMACY | Facility: CLINIC | Age: 84
End: 2024-02-14
Payer: MEDICARE

## 2024-02-14 ENCOUNTER — APPOINTMENT (OUTPATIENT)
Dept: CARDIOLOGY | Facility: HOSPITAL | Age: 84
DRG: 470 | End: 2024-02-14
Payer: MEDICARE

## 2024-02-14 LAB
ANION GAP SERPL CALC-SCNC: 13 MMOL/L (ref 10–20)
ATRIAL RATE: 68 BPM
BUN SERPL-MCNC: 14 MG/DL (ref 6–23)
CALCIUM SERPL-MCNC: 8.6 MG/DL (ref 8.6–10.3)
CHLORIDE SERPL-SCNC: 100 MMOL/L (ref 98–107)
CO2 SERPL-SCNC: 26 MMOL/L (ref 21–32)
CREAT SERPL-MCNC: 0.58 MG/DL (ref 0.5–1.05)
EGFRCR SERPLBLD CKD-EPI 2021: 90 ML/MIN/1.73M*2
ERYTHROCYTE [DISTWIDTH] IN BLOOD BY AUTOMATED COUNT: 12.8 % (ref 11.5–14.5)
GLUCOSE SERPL-MCNC: 112 MG/DL (ref 74–99)
HCT VFR BLD AUTO: 33 % (ref 36–46)
HGB BLD-MCNC: 11.5 G/DL (ref 12–16)
MCH RBC QN AUTO: 31.1 PG (ref 26–34)
MCHC RBC AUTO-ENTMCNC: 34.8 G/DL (ref 32–36)
MCV RBC AUTO: 89 FL (ref 80–100)
NRBC BLD-RTO: 0 /100 WBCS (ref 0–0)
P AXIS: 71 DEGREES
P OFFSET: 211 MS
P ONSET: 155 MS
PLATELET # BLD AUTO: 257 X10*3/UL (ref 150–450)
POTASSIUM SERPL-SCNC: 4.2 MMOL/L (ref 3.5–5.3)
PR INTERVAL: 132 MS
Q ONSET: 221 MS
QRS COUNT: 12 BEATS
QRS DURATION: 78 MS
QT INTERVAL: 402 MS
QTC CALCULATION(BAZETT): 427 MS
QTC FREDERICIA: 419 MS
R AXIS: 40 DEGREES
RBC # BLD AUTO: 3.7 X10*6/UL (ref 4–5.2)
SODIUM SERPL-SCNC: 135 MMOL/L (ref 136–145)
T AXIS: 56 DEGREES
T OFFSET: 422 MS
VENTRICULAR RATE: 68 BPM
WBC # BLD AUTO: 8.9 X10*3/UL (ref 4.4–11.3)

## 2024-02-14 PROCEDURE — RXMED WILLOW AMBULATORY MEDICATION CHARGE

## 2024-02-14 PROCEDURE — 85027 COMPLETE CBC AUTOMATED: CPT | Performed by: HOSPITALIST

## 2024-02-14 PROCEDURE — 93005 ELECTROCARDIOGRAM TRACING: CPT

## 2024-02-14 PROCEDURE — 97116 GAIT TRAINING THERAPY: CPT | Mod: GP,CQ | Performed by: PHYSICAL THERAPY ASSISTANT

## 2024-02-14 PROCEDURE — 97535 SELF CARE MNGMENT TRAINING: CPT | Mod: GO

## 2024-02-14 PROCEDURE — 2500000005 HC RX 250 GENERAL PHARMACY W/O HCPCS: Performed by: HOSPITALIST

## 2024-02-14 PROCEDURE — 99232 SBSQ HOSP IP/OBS MODERATE 35: CPT | Performed by: HOSPITALIST

## 2024-02-14 PROCEDURE — 97165 OT EVAL LOW COMPLEX 30 MIN: CPT | Mod: GO

## 2024-02-14 PROCEDURE — 97110 THERAPEUTIC EXERCISES: CPT | Mod: GP,CQ | Performed by: PHYSICAL THERAPY ASSISTANT

## 2024-02-14 PROCEDURE — 36415 COLL VENOUS BLD VENIPUNCTURE: CPT | Performed by: HOSPITALIST

## 2024-02-14 PROCEDURE — 2500000001 HC RX 250 WO HCPCS SELF ADMINISTERED DRUGS (ALT 637 FOR MEDICARE OP): Performed by: HOSPITALIST

## 2024-02-14 PROCEDURE — 2500000002 HC RX 250 W HCPCS SELF ADMINISTERED DRUGS (ALT 637 FOR MEDICARE OP, ALT 636 FOR OP/ED): Performed by: HOSPITALIST

## 2024-02-14 PROCEDURE — 1100000001 HC PRIVATE ROOM DAILY

## 2024-02-14 PROCEDURE — 97530 THERAPEUTIC ACTIVITIES: CPT | Mod: GP,CQ | Performed by: PHYSICAL THERAPY ASSISTANT

## 2024-02-14 PROCEDURE — 80048 BASIC METABOLIC PNL TOTAL CA: CPT | Performed by: HOSPITALIST

## 2024-02-14 PROCEDURE — 2500000004 HC RX 250 GENERAL PHARMACY W/ HCPCS (ALT 636 FOR OP/ED): Performed by: HOSPITALIST

## 2024-02-14 RX ORDER — FLUDROCORTISONE ACETATE 0.1 MG/1
0.1 TABLET ORAL DAILY
Status: DISCONTINUED | OUTPATIENT
Start: 2024-02-14 | End: 2024-02-15 | Stop reason: HOSPADM

## 2024-02-14 RX ADMIN — ACETAMINOPHEN 975 MG: 325 TABLET ORAL at 17:25

## 2024-02-14 RX ADMIN — LEVOTHYROXINE SODIUM 88 MCG: 88 TABLET ORAL at 09:19

## 2024-02-14 RX ADMIN — LIDOCAINE 1 PATCH: 4 PATCH TOPICAL at 09:19

## 2024-02-14 RX ADMIN — ATORVASTATIN CALCIUM 10 MG: 10 TABLET, FILM COATED ORAL at 09:19

## 2024-02-14 RX ADMIN — FLUDROCORTISONE ACETATE 0.1 MG: 0.1 TABLET ORAL at 13:33

## 2024-02-14 RX ADMIN — ACETAMINOPHEN 975 MG: 325 TABLET ORAL at 09:18

## 2024-02-14 RX ADMIN — ONDANSETRON 4 MG: 2 INJECTION INTRAMUSCULAR; INTRAVENOUS at 05:41

## 2024-02-14 RX ADMIN — OXYCODONE HYDROCHLORIDE 5 MG: 5 TABLET ORAL at 19:56

## 2024-02-14 RX ADMIN — OXYCODONE HYDROCHLORIDE 5 MG: 5 TABLET ORAL at 06:14

## 2024-02-14 RX ADMIN — OXYCODONE HYDROCHLORIDE 5 MG: 5 TABLET ORAL at 12:52

## 2024-02-14 RX ADMIN — ENOXAPARIN SODIUM 40 MG: 40 INJECTION SUBCUTANEOUS at 23:35

## 2024-02-14 RX ADMIN — ACETAMINOPHEN 975 MG: 325 TABLET ORAL at 21:15

## 2024-02-14 RX ADMIN — POLYETHYLENE GLYCOL 3350 17 G: 17 POWDER, FOR SOLUTION ORAL at 09:18

## 2024-02-14 SDOH — ECONOMIC STABILITY: HOUSING INSECURITY: IN THE LAST 12 MONTHS, HOW MANY PLACES HAVE YOU LIVED?: 1

## 2024-02-14 SDOH — ECONOMIC STABILITY: INCOME INSECURITY: IN THE LAST 12 MONTHS, WAS THERE A TIME WHEN YOU WERE NOT ABLE TO PAY THE MORTGAGE OR RENT ON TIME?: NO

## 2024-02-14 SDOH — HEALTH STABILITY: MENTAL HEALTH: HOW OFTEN DO YOU HAVE A DRINK CONTAINING ALCOHOL?: 4 OR MORE TIMES A WEEK

## 2024-02-14 SDOH — ECONOMIC STABILITY: INCOME INSECURITY: HOW HARD IS IT FOR YOU TO PAY FOR THE VERY BASICS LIKE FOOD, HOUSING, MEDICAL CARE, AND HEATING?: NOT HARD AT ALL

## 2024-02-14 SDOH — SOCIAL STABILITY: SOCIAL NETWORK: ARE YOU MARRIED, WIDOWED, DIVORCED, SEPARATED, NEVER MARRIED, OR LIVING WITH A PARTNER?: MARRIED

## 2024-02-14 SDOH — ECONOMIC STABILITY: FOOD INSECURITY: WITHIN THE PAST 12 MONTHS, THE FOOD YOU BOUGHT JUST DIDN'T LAST AND YOU DIDN'T HAVE MONEY TO GET MORE.: NEVER TRUE

## 2024-02-14 SDOH — ECONOMIC STABILITY: TRANSPORTATION INSECURITY
IN THE PAST 12 MONTHS, HAS LACK OF TRANSPORTATION KEPT YOU FROM MEETINGS, WORK, OR FROM GETTING THINGS NEEDED FOR DAILY LIVING?: NO

## 2024-02-14 SDOH — HEALTH STABILITY: MENTAL HEALTH: HOW OFTEN DO YOU HAVE 6 OR MORE DRINKS ON ONE OCCASION?: NEVER

## 2024-02-14 SDOH — ECONOMIC STABILITY: FOOD INSECURITY: WITHIN THE PAST 12 MONTHS, YOU WORRIED THAT YOUR FOOD WOULD RUN OUT BEFORE YOU GOT MONEY TO BUY MORE.: NEVER TRUE

## 2024-02-14 SDOH — ECONOMIC STABILITY: INCOME INSECURITY: IN THE PAST 12 MONTHS, HAS THE ELECTRIC, GAS, OIL, OR WATER COMPANY THREATENED TO SHUT OFF SERVICE IN YOUR HOME?: NO

## 2024-02-14 SDOH — ECONOMIC STABILITY: HOUSING INSECURITY
IN THE LAST 12 MONTHS, WAS THERE A TIME WHEN YOU DID NOT HAVE A STEADY PLACE TO SLEEP OR SLEPT IN A SHELTER (INCLUDING NOW)?: NO

## 2024-02-14 SDOH — HEALTH STABILITY: MENTAL HEALTH: HOW MANY STANDARD DRINKS CONTAINING ALCOHOL DO YOU HAVE ON A TYPICAL DAY?: 1 OR 2

## 2024-02-14 SDOH — ECONOMIC STABILITY: TRANSPORTATION INSECURITY
IN THE PAST 12 MONTHS, HAS THE LACK OF TRANSPORTATION KEPT YOU FROM MEDICAL APPOINTMENTS OR FROM GETTING MEDICATIONS?: NO

## 2024-02-14 ASSESSMENT — COGNITIVE AND FUNCTIONAL STATUS - GENERAL
TURNING FROM BACK TO SIDE WHILE IN FLAT BAD: A LITTLE
DAILY ACTIVITIY SCORE: 20
WALKING IN HOSPITAL ROOM: A LITTLE
DAILY ACTIVITIY SCORE: 21
WALKING IN HOSPITAL ROOM: A LITTLE
STANDING UP FROM CHAIR USING ARMS: A LITTLE
WALKING IN HOSPITAL ROOM: A LITTLE
TURNING FROM BACK TO SIDE WHILE IN FLAT BAD: A LITTLE
PERSONAL GROOMING: A LITTLE
MOVING FROM LYING ON BACK TO SITTING ON SIDE OF FLAT BED WITH BEDRAILS: A LITTLE
DAILY ACTIVITIY SCORE: 22
CLIMB 3 TO 5 STEPS WITH RAILING: A LITTLE
CLIMB 3 TO 5 STEPS WITH RAILING: TOTAL
MOVING FROM LYING ON BACK TO SITTING ON SIDE OF FLAT BED WITH BEDRAILS: A LITTLE
MOBILITY SCORE: 18
HELP NEEDED FOR BATHING: A LITTLE
MOBILITY SCORE: 17
TURNING FROM BACK TO SIDE WHILE IN FLAT BAD: A LITTLE
TOILETING: A LITTLE
DRESSING REGULAR LOWER BODY CLOTHING: A LITTLE
STANDING UP FROM CHAIR USING ARMS: A LITTLE
CLIMB 3 TO 5 STEPS WITH RAILING: A LOT
DRESSING REGULAR UPPER BODY CLOTHING: A LITTLE
MOBILITY SCORE: 17
STANDING UP FROM CHAIR USING ARMS: A LITTLE
TURNING FROM BACK TO SIDE WHILE IN FLAT BAD: A LITTLE
MOVING FROM LYING ON BACK TO SITTING ON SIDE OF FLAT BED WITH BEDRAILS: A LITTLE
WALKING IN HOSPITAL ROOM: A LITTLE
TOILETING: A LITTLE
MOVING TO AND FROM BED TO CHAIR: A LITTLE
HELP NEEDED FOR BATHING: A LITTLE
MOBILITY SCORE: 16
DRESSING REGULAR LOWER BODY CLOTHING: A LITTLE
TOILETING: A LITTLE
MOVING TO AND FROM BED TO CHAIR: A LITTLE
CLIMB 3 TO 5 STEPS WITH RAILING: A LOT
MOVING FROM LYING ON BACK TO SITTING ON SIDE OF FLAT BED WITH BEDRAILS: A LITTLE
STANDING UP FROM CHAIR USING ARMS: A LITTLE
MOVING TO AND FROM BED TO CHAIR: A LITTLE
MOVING TO AND FROM BED TO CHAIR: A LITTLE

## 2024-02-14 ASSESSMENT — PAIN SCALES - GENERAL
PAINLEVEL_OUTOF10: 3
PAINLEVEL_OUTOF10: 5 - MODERATE PAIN
PAINLEVEL_OUTOF10: 6
PAINLEVEL_OUTOF10: 3
PAINLEVEL_OUTOF10: 6
PAINLEVEL_OUTOF10: 5 - MODERATE PAIN
PAINLEVEL_OUTOF10: 2
PAINLEVEL_OUTOF10: 1
PAINLEVEL_OUTOF10: 2
PAINLEVEL_OUTOF10: 7

## 2024-02-14 ASSESSMENT — ACTIVITIES OF DAILY LIVING (ADL)
HOME_MANAGEMENT_TIME_ENTRY: 25
ADL_ASSISTANCE: INDEPENDENT

## 2024-02-14 ASSESSMENT — PAIN - FUNCTIONAL ASSESSMENT
PAIN_FUNCTIONAL_ASSESSMENT: 0-10

## 2024-02-14 ASSESSMENT — LIFESTYLE VARIABLES
SKIP TO QUESTIONS 9-10: 1
AUDIT-C TOTAL SCORE: 4

## 2024-02-14 ASSESSMENT — PAIN DESCRIPTION - LOCATION
LOCATION: HIP
LOCATION: HIP

## 2024-02-14 ASSESSMENT — PAIN DESCRIPTION - ORIENTATION: ORIENTATION: RIGHT

## 2024-02-14 NOTE — PROGRESS NOTES
Occupational Therapy    Evaluation/Treatment    Patient Name: Mera Ansari  MRN: [id number]  : 1940  Today's Date: 24  Time Calculation  Start Time: 1410  Stop Time: 1450  Time Calculation (min): 40 min       Assessment:  OT Assessment: 82 yo presents with decline from functional baseline POD#1 s/p R KENNY. Demo's good understanding of KENNY precautions, will continue to follow to further address ADL impairments.  Prognosis: Excellent  Barriers to Discharge: None  Evaluation/Treatment Tolerance: Patient tolerated treatment well  Medical Staff Made Aware: Yes  End of Session Communication: Bedside nurse  End of Session Patient Position: Alarm on, Up in chair  OT Assessment Results: Decreased ADL status, Decreased endurance, Decreased IADLs, Decreased functional mobility  Prognosis: Excellent  Barriers to Discharge: None  Evaluation/Treatment Tolerance: Patient tolerated treatment well  Medical Staff Made Aware: Yes  Strengths: Ability to acquire knowledge, Coping skills, Attitude of self, Support of Caregivers, Premorbid level of function  Barriers to Participation:  (orthostatic hypotension)  Plan:  Treatment Interventions: ADL retraining, Functional transfer training, Endurance training, Patient/family training, Neuromuscular reeducation  OT Frequency: 3 times per week  OT Discharge Recommendations: Low intensity level of continued care  Equipment Recommended upon Discharge:  (recommend pt purchase shower chair)  OT Recommended Transfer Status: Stand by assist, Assist of 1  OT - OK to Discharge: Yes  Treatment Interventions: ADL retraining, Functional transfer training, Endurance training, Patient/family training, Neuromuscular reeducation    Subjective   Current Problem:  1. Syncope, vasovagal        2. Unilateral primary osteoarthritis, right hip  XR pelvis 1-2 views    XR pelvis 1-2 views    aspirin 81 mg EC tablet    ketorolac (Toradol) 10 mg tablet    oxyCODONE (Roxicodone) 5 mg immediate release tablet  "   Referral to Home Health    Referral to Physical Therapy    DISCONTINUED: docusate sodium (Colace) 100 mg capsule      3. 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 (CMS/HCC)  docusate sodium (Colace) 100 mg capsule        General:   OT Received On: 02/14/24  General  Reason for Referral: impaired mobility s/p R KENNY  Referred By: DO Ruslan Resident  Past Medical History Relevant to Rehab: OA, HLD, [place], chronic discoid lupus, L breast lumpectomy, migraines, TIA, osteopenia, syncope (vasovagal) - per pt and family this occurs at most once per year (even while hiking)  Family/Caregiver Present: Yes  Caregiver Feedback:  present though left during mobility portion of assessment  Prior to Session Communication: Bedside nurse  Patient Position Received: Bed, 3 rail up, Alarm on  Preferred Learning Style: kinesthetic, verbal, visual  General Comment: Pt goes by \"Morena\"   Pt supine, c/f orthostatic hypotension d/t episode during PT session this am and yesterday during PT eval. BP monitiored throughout session  Precautions:  LE Weight Bearing Status: Weight Bearing as Tolerated  Medical Precautions: Fall precautions  Post-Surgical Precautions: Right hip precautions  Precautions Comment: introduced and review R KENNY precautions with pt and family; provided written handout packet for subsequent review.  Vital Signs:  BP: 122/61 (supine:122/61; initial standing x3 min:152/75; after toileting and standing x5 min:99/61; sitting in chair at end of session: 122/70)  Pain:  Pain Assessment  Pain Assessment: 0-10  Pain Score: 2  Patient's Stated Pain Goal: 4    Objective   Cognition:  Orientation Level: Oriented X4  Attention: Within Functional Limits  Problem Solving: Within Functional Limits  Abstract Reasoning: Within Functional Limits  Insight: Within function limits           Home Living:  Type of Home: House  Lives With: Spouse  Home Adaptive Equipment: Walker rolling or standard, Cane, Reacher, Sock aid (hip " Kit)  Home Layout: Two level, Bed/bath upstairs, Full bath main level, Stairs to alternate level with rails  Alternate Level Stairs-Rails: Left  Home Access: Stairs to enter without rails  Bathroom Shower/Tub: Walk-in shower, Tub/shower unit  Bathroom Toilet: Handicapped height  Bathroom Accessibility: has a walk in shower on the first level available. tub/shower upstairs  Prior Function:  Level of [place]: Independent with ADLs and functional transfers, Independent with homemaking with ambulation  ADL Assistance: Independent  Homemaking Assistance: Independent  Ambulatory Assistance: Independent  Leisure: hiking club, tennis club, reading club, traveling  Prior Function Comments: patient is very active at baseline, active . reports progressive impact of R hip pain on her mobility and activity level, especially since 12/1/23, precipitating the need for R KENNY.  IADL History:  Homemaking Responsibilities: Yes  IADL Comments: able to manage and assist with all IADLs  ADL:    Grooming  Grooming Level of Assistance: Close supervision, Minimal verbal cues  Grooming Where Assessed: Standing sinkside  Grooming Comments: min cues for hand placement    LE Dressing  LE Dressing: Yes  Sock Level of Assistance: Maximum assistance (due to urinary incontinence; pt verbalized understanding of how to use sock aide)  Adult Briefs Level of Assistance: Contact guard, Moderate verbal cues  LE Dressing Where Assessed: Toilet  LE Dressing Comments: educated on threading technique with reacher to don underwear, good return demo. Pt able to hike over hips in standing with CGA    Toileting  Toileting Level of Assistance: Close supervision, Setup  Where Assessed: Toilet    Activity Tolerance:  Endurance: Decreased tolerance for upright activites  Functional Standing Tolerance:     Bed Mobility/Transfers: Bed Mobility  Bed Mobility: Yes  Bed Mobility 1  Bed Mobility 1: Supine to sitting, Sitting to supine  Level of Assistance  1: Close supervision, Minimal verbal cues  Bed Mobility Comments 1: good return demo with bed simulated flat, maintained KENNY precautions  Bed Mobility 2  Bed Mobility  2: Scooting  Level of Assistance 2: Modified independent    Transfers  Transfer: Yes  Transfers 2  Technique 2: Stand to sit, Sit to stand (on commode (regular surface height))  Transfer Level of Assistance 2: Set up, Moderate verbal cues, Moderate tactile cues, Contact guard      Ambulation/Gait Training:  Ambulation/Gait Training  Ambulation/Gait Training Performed: Yes (ambulated to/from restroom with ww, no overt balance deficits observed, min cues for adherence to KENNY precautions while turning)  Sitting Balance:  Static Sitting Balance  Static Sitting-Level of Assistance: Independent  Dynamic Sitting Balance  Dynamic Sitting-Balance:  (independent)  Standing Balance:  Static Standing Balance  Static Standing-Level of Assistance: Close supervision  Dynamic Standing Balance  Dynamic Standing-Balance:  (CGA)    Sensation:  Light Touch: No apparent deficits    Coordination:  Movements are Fluid and Coordinated: Yes     Extremities: RUE   RUE : Within Functional Limits and LUE   LUE: Within Functional Limits    Outcome Measures:   [facility] Daily Activity  Putting on and taking off regular lower body clothing: A little  Bathing (including washing, rinsing, drying): A little  Putting on and taking off regular upper body clothing: None  Toileting, which includes using toilet, bedpan or urinal: A little  Taking care of personal grooming such as brushing teeth: None  Eating Meals: None  Daily Activity - Total Score: 21    Education Documentation  Body Mechanics, taught by Axel Leos OT at 2/14/2024  3:53 PM.  Learner: Patient  Readiness: Acceptance  Method: Explanation  Response: Verbalizes Understanding    Precautions, taught by Axel Leos OT at 2/14/2024  3:53 PM.  Learner: Patient  Readiness: Acceptance  Method: Explanation  Response:  Verbalizes Understanding    ADL Training, taught by Axel Leos OT at 2/14/2024  3:53 PM.  Learner: Patient  Readiness: Acceptance  Method: Explanation  Response: Verbalizes Understanding    Education Comments  No comments found.      Goals:  Encounter Problems       Encounter Problems (Active)       ADLs       Patient will perform UB and LB bathing  with modified independent level of assistance and shower chair & long handled bath sponge.       Start:  02/14/24    Expected End:  02/21/24            Patient with complete lower body dressing with modified independent level of assistance donning and doffing all LE clothes  with adaptive equipment while edge of bed        Start:  02/14/24    Expected End:  02/21/24            Patient will complete daily grooming tasks  with modified independent level of assistance while standing at sink       Start:  02/14/24    Expected End:  02/21/24            Patient will complete toileting including hygiene clothing management/hygiene with modified independent level of assistance and raised toilet seat and grab bars.       Start:  02/14/24    Expected End:  02/21/24               COGNITION/SAFETY       Patient will recall and adhere to hip precautions during all functional mobility/ADL tasks in order to demonstrate improved understanding and promote healing post op       Start:  02/14/24    Expected End:  02/21/24               MOBILITY       Patient will perform Functional mobility  Household distances/Community Distances with modified independent level of assistance and least restrictive device in order to improve safety and functional mobility.       Start:  02/14/24    Expected End:  02/21/24

## 2024-02-14 NOTE — PROGRESS NOTES
"Mera Ansari is a 83 y.o. female on day 0 of admission presenting with Unilateral primary osteoarthritis, right hip.    Subjective   Patient syncopized with physical therapy yesterday in PACU.  She was evaluated by the internal medicine service that this was likely a vasovagal event.  She was admitted overnight for further monitoring.  Patient states she is having some incisional pain in her right hip.  Pending therapy today.  Patient denies fevers, chills, nausea, vomiting, chest pain or shortness of breath.       Objective     Physical Exam  Constitutional: NAD, resting comfortably in bed  Skin: Warm and dry, no rashes   Eyes: EOMI, clear sclera   ENMT: MMM   HEENT: Neck supple without apparent injury, EOMI, MMM  Respiratory: NWOB on RA   CV: RRR per peripheral pulses, limbs wwp  GI: soft, non-distended   Lymph: No apparent LAD  Neuro: PAUL spontaneously, CNs II - XII grossly intact   Psych: Appropriate mood and behavior   MSK:   -Right lower extremity  -Iceman dressing in place.  Clean dry without strikethrough.  - SILT s/s/sp/dp/t  - fires PF/DF/EHL  - Toes WWP, 2+ DP pulses  - Calf soft and supple bilat         Last Recorded Vitals  Blood pressure 127/63, pulse 62, temperature 35.9 °C (96.6 °F), temperature source Temporal, resp. rate 16, height 1.59 m (5' 2.6\"), weight 51.4 kg (113 lb 5.1 oz), SpO2 95 %.  Intake/Output last 3 Shifts:  I/O last 3 completed shifts:  In: 2056.7 (40 mL/kg) [I.V.:2056.7 (40 mL/kg)]  Out: 450 (8.8 mL/kg) [Urine:350 (0.2 mL/kg/hr); Blood:100]  Weight: 51.4 kg     Relevant Results      Scheduled medications  acetaminophen, 975 mg, oral, TID  atorvastatin, 10 mg, oral, Daily  enoxaparin, 40 mg, subcutaneous, q24h  levothyroxine, 88 mcg, oral, Daily  lidocaine, 1 patch, transdermal, Daily  polyethylene glycol, 17 g, oral, Daily      Continuous medications     PRN medications  PRN medications: ondansetron, oxyCODONE  No results found for this or any previous visit (from the past 24 " hour(s)).         Assessment/Plan   Principal Problem:    Unilateral primary osteoarthritis, right hip  Active Problems:    Syncope, vasovagal    83-year-old female (hyperlipidemia, hypothyroidism, migraines, TIA 2020 with no residual defect, and vasovagal syncope) status post right total hip arthroplasty with Dr. Macias on 2/13/2024    Plan:  -Weightbearing as tolerated right lower extremity, posterior precautions.  -Multimodal pain control.  -Ancef for 24 hours and postoperative.  -Postop x-rays of the pelvis were reviewed.  -SCDs, MOUSTAPHA hose.  -Aspir 81 twice daily starting postop day 1 for DVT prophylaxis to be continued for 4 weeks.  -PT and OT to evaluate and treat.  -Surgical dressing covering incision on the right hip to remain in place until follow-up.  -Encourage OB, frequent I-S use.  -Regular diet with bowel regimen.  -Follow-up with Dr. Macias in 2 weeks.    Dispo: Admitted to medicine.          Srinivas Mercer DO   PGY-III  Orthopaedic Surgery   Pager: [phone]  Epic Chat Preferred                  Srinivas Mercer DO

## 2024-02-14 NOTE — SIGNIFICANT EVENT
In bathroom with OT, OT notified this nurse; assisted patient into chair from toilet with two assist, patient awake and talking to staff, reports dizziness was there but subsided. Assisted patient safely back to bed via pushing patient in chair from toilet to the bed, then stood and pivoted patient back to bed safely.

## 2024-02-14 NOTE — H&P
[facility] History and Physical      Mera Ansari    :  1940(83 y.o.)    MRN:  [id number]  Date: 24     Assessment and Plan:      Vasovagal syncope  Right hip OA s/p right hip total arthroplasty  HLD  Hypothyroidism    Plan:  - Vasovagal syncope in setting of post op pain. EKG without acute ST-T changes. Check orthostatic vitals in AM.   - PT/OT eval in AM  - cortes tylenol, lidocaine patch. Oxycodone prn.     DVT Prophylaxis: subcutaneous Lovenox      BMI Classification: Body mass index is 20.33 kg/m². - normal BMI 18.5-24.9    Disposition: Admit for observation,  await clinical improvement and treatment response, and await consultant recommendations    The patient/family had opportunity to ask questions. All questions were answered to the best of my ability.    Between 7AM-7PM please message me via Epic Secure Chat.  After 7PM please page Nocturnist on call.    Electronically signed by Jovon Cunningham DO on 24 at 10:20 PM     Subjective:      Chief Complaint: syncope  PCP: Fifi Hou MD     HPI:    Mera Ansari is a 83 y.o. female who was at [facility] for elective right hip arthroplasty. Rapid response called for syncopal episode on this patient.  Patient was ambulating to the bathroom and when she had a syncopal episode.  Fortunately RN was with her and able to catch her.  Did not fall or hit her head.  Denies preceding chest pain or shortness of breath.  No palpitations.  Currently does not have chest pain or shortness of breath.  States she felt lightheaded before she passed out.  This was the first time she had gotten out of bed and put weight on her foot after her right hip arthroplasty this morning. Prior to this episode orthostatic vitals were checked by the RN and reportedly negative.  She reports she has history of vasovagal syncope.  This appears to be a vasovagal event.  She did have an episode of emesis.  EKG without acute ST-T changes.      Past Medical History:   Diagnosis Date     Chronic discoid lupus erythematosus     scalp    Hearing aid worn     HL (hearing loss)     Hyperlipidemia     Hypothyroidism     Migraine     Neuralgia     OA (osteoarthritis)     Osteopenia     Syncope, vasovagal     no episodes recently    TIA (transient ischemic attack) 2020    possible TIA vs ocular migraine, no residual deficit    Vitiligo     chest       Past Surgical History:   Procedure Laterality Date    BREAST LUMPECTOMY Left 1992    benign    CATARACT EXTRACTION Bilateral     COLONOSCOPY      CT ANGIO NECK  05/29/2020    [place] N[place] ANGIO W AND WO [place] [facility] [place]    CT HEAD ANGIO W AND WO IV CONTRAST  05/27/2020    CT HEAD ANGIO W AND WO IV [place] [facility] [place]    ELBOW FRACTURE SURGERY Left 2014    SOFT TISSUE CYST EXCISION Left     histoplasmosis, 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    TONSILLECTOMY      as a child    TUBAL LIGATION  1970       Family History   Problem Relation Name Age of Onset    Stroke Mother      Autoimmune disease Mother      Celiac disease Mother      Prostate cancer Father      Autoimmune disease Sister      Immunodeficiency Sister      Pernicious anemia Sister      Heart attack Brother         Social History     Socioeconomic History    Marital status:      Spouse name: Not on file    Number of children: Not on file    Years of education: Not on file    Highest education level: Not on file   Occupational History    Not on file   Tobacco Use    Smoking status: Never    Smokeless tobacco: Never   Vaping Use    Vaping Use: Never used   Substance and Sexual Activity    Alcohol use: Not Currently     Alcohol/week: 7.0 standard drinks of alcohol     Types: 7 Glasses of wine per week    Drug use: Never    Sexual activity: Not on file   Other Topics Concern    Not on file   Social History Narrative    Not on file     Social Determinants of Health     Financial Resource Strain: Not on file   Food Insecurity: Not on file   Transportation Needs:  Not on file   Physical Activity: Not on file   Stress: Not on file   Social Connections: Not on file   Intimate Partner Violence: Not on file   Housing Stability: Not on file       Allergies   Allergen Reactions    Bee Pollen Runny nose     Sneezing and Itching    Cat Dander Runny nose    Cat Hair Standardized Allergenic Extract Runny nose    Dog Dander Runny nose       Prior to Admission medications    Medication Sig Start Date End Date Taking? Authorizing Provider   acetaminophen (Tylenol) 500 mg tablet Take 1 tablet (500 mg) by mouth once daily as needed for mild pain (1 - 3).   Yes Historical Provider, MD   atorvastatin (Lipitor) 10 mg tablet TAKE 1 TABLET DAILY AS DIRECTED 3/9/23  Yes Fifi Hou MD   chlorhexidine (Peridex) 0.12 % solution 15 ml swish and spit for 30 seconds night prior to surgery and morning of surgery 2/6/24  Yes Cele Whittington PA-C   cholecalciferol (Vitamin D-3) 25 MCG (1000 UT) tablet Take 1 tablet (25 mcg) by mouth once daily.   Yes Historical Provider, MD   docosahexaenoic acid/epa (FISH OIL ORAL) Take by mouth.   Yes Historical Provider, MD   ibuprofen 200 mg tablet Take 2 tablets (400 mg) by mouth every 8 hours if needed for mild pain (1 - 3).   Yes Historical Provider, MD   levothyroxine (Synthroid, Levoxyl) 88 mcg tablet TAKE 1 TABLET DAILY AS DIRECTED 5/8/23  Yes Fifi Hou MD   ubidecarenone (COENZYME Q10, BULK, MISC) Take by mouth.   Yes Historical Provider, MD   aspirin 81 mg EC tablet Take by mouth.  2/13/24 Yes Historical Provider, MD   aspirin 81 mg EC tablet Take 1 tablet (81 mg) by mouth 2 times a day for 28 days. After this may resume home daily aspirin regimen. 2/13/24 3/12/24  Srinivas Mercer DO   docusate sodium (Colace) 100 mg capsule Take 1 capsule (100 mg) by mouth 2 times a day as needed for constipation for up to 7 days. 2/13/24 2/20/24  Srinivas Mercer DO   docusate sodium (Colace) 100 mg capsule Take 2 capsules (200 mg) by mouth 2 times  a day as needed for constipation. 2/13/24   Srinivas KINCAID Ruslan, DO   ketorolac (Toradol) 10 mg tablet Take 1 tablet (10 mg) by mouth every 8 hours if needed for moderate pain (4 - 6) for up to 3 days. 2/13/24 2/16/24  Srinivas KINCAID Ruslan, DO   oxyCODONE (Roxicodone) 5 mg immediate release tablet Take 1 tablet (5 mg) by mouth every 6 hours if needed for severe pain (7 - 10) for up to 7 days. 2/13/24 2/20/24  Srinivas KINCAID Ruslan, DO   traMADol (Ultram) 50 mg tablet Take 1 tablet (50 mg) by mouth every 8 hours if needed for severe pain (7 - 10).  Patient not taking: Reported on 2/6/2024 12/22/23   Danie Macias MD       Review of systems:   Other than patient's chronic conditions and those complaints in the history above, the rest of the 10 systems review were done and were negative.     Objective:      Vitals:    02/13/24 1240 02/13/24 1405 02/13/24 1500 02/13/24 2016   BP: 158/66 123/79 167/70 149/70   BP Location:  Left arm     Patient Position:  Sitting  Sitting   Pulse: 75 64 66 65   Resp: 20  16 16   Temp:   36.4 °C (97.5 °F) 36.3 °C (97.3 °F)   TempSrc:   Skin Temporal   SpO2: 95%  97% 98%   Weight:            Physical Exam  Vitals and nursing note reviewed.   HENT:      Mouth/Throat:      Mouth: Mucous membranes are moist.      Pharynx: Oropharynx is clear.   Eyes:      Extraocular Movements: Extraocular movements intact.      Pupils: Pupils are equal, round, and reactive to light.   Cardiovascular:      Rate and Rhythm: Normal rate and regular rhythm.      Heart sounds: No murmur heard.  Pulmonary:      Effort: Pulmonary effort is normal.   Abdominal:      Palpations: Abdomen is soft.   Musculoskeletal:      Comments: Limited ROM RLE due to pain   Neurological:      Mental Status: She is alert and oriented to person, place, and time.      Cranial Nerves: No cranial nerve deficit.         Labs:   Lab Results   Component Value Date     02/06/2024    K 4.0 02/06/2024     02/06/2024    CO2  28 02/06/2024    BUN 17 02/06/2024    CREATININE 0.80 02/06/2024    GLUCOSE 103 (H) 02/06/2024    CALCIUM 9.4 02/06/2024    PROT 6.9 05/31/2023    BILITOT 0.8 05/31/2023    ALKPHOS 53 05/31/2023    AST 22 05/31/2023    ALT 15 05/31/2023       Lab Results   Component Value Date    WBC 6.8 02/06/2024    HGB 13.6 02/06/2024    HCT 41.3 02/06/2024    MCV 94 02/06/2024     02/06/2024       Imaging:   XR pelvis 1-2 views    Result Date: 2/13/2024  STUDY: Pelvis Radiographs; 2/13/2024 at 11:00 hours. INDICATION: Post op right total hip arthroplasty. COMPARISON: XR hip 2/6/2024 and 12/7/2023. ACCESSION NUMBER(S): [id number] ORDERING CLINICIAN: TAMEKA TORRES TECHNIQUE:  One view(s) of the pelvis. Two images. FINDINGS:  The pelvic ring is intact.  There is no acute fracture.  Anatomic alignment status post right hip replacement.  Degenerative changes left hip joint.    Anatomic alignment status post right total hip arthroplasty. Signed by Jose Manuel Jung MD

## 2024-02-14 NOTE — PROGRESS NOTES
02/14/24 1102   Discharge Planning   Living Arrangements Spouse/significant other   Support Systems Spouse/significant other   Assistance Needed none   Type of Residence Private residence   Number of Stairs to Enter Residence 1   Number of Stairs Within Residence 12   Do you have animals or pets at home? No   Who is requesting discharge planning? Provider   Home or Post Acute Services In home services   Type of Home Care Services Home OT;Home PT   Patient expects to be discharged to: home   Does the patient need discharge transport arranged? No   Financial Resource Strain   How hard is it for you to pay for the very basics like food, housing, medical care, and heating? Not hard   Housing Stability   In the last 12 months, was there a time when you were not able to pay the mortgage or rent on time? N   In the last 12 months, how many places have you lived? 1   In the last 12 months, was there a time when you did not have a steady place to sleep or slept in a shelter (including now)? N   Transportation Needs   In the past 12 months, has lack of transportation kept you from medical appointments or from getting medications? no   In the past 12 months, has lack of transportation kept you from meetings, work, or from getting things needed for daily living? No   Patient Choice   Provider Choice list and CMS website (https://medicare.gov/care-compare#search) for post-acute Quality and Resource Measure Data were provided and reviewed with: Patient   Patient / Family choosing to utilize agency / facility established prior to hospitalization No     2/14/24 1103  Met with patient at bedside to discuss discharge planning.  Patient lives at home with her  in a house.  She is independent with ADLs and drives at baseline.  She does not use any assistive devices for ambulation.  She plans on returning home at discharge and is agreeable to [facility].  [facility] options and expectations reviewed with patient.  Patient would like to use  [facility].  [facility]    2/14/24 4933  Patient had a syncopal episode with OT. Discharge on hold for today.  [facility] notified.  [facility] RN TCC

## 2024-02-14 NOTE — NURSING NOTE
Interdisciplinary team present: NP, PT, NM, CC, SW, Orthopedic Coordinator, and bedside RN.  Pain - controlled  Nausea - none  Discharge barrier - orthostatic hypotension yesterday but will observe until this afternoon  Discharge plan - home with [facility]  Discharge date/time - possibly today pending PT

## 2024-02-14 NOTE — PROGRESS NOTES
Physical Therapy    Physical Therapy Treatment    Patient Name: Mera Ansari  MRN: [id number]  Today's Date: 2/14/2024  Time Calculation  Start Time: 1020  Stop Time: 1105  Time Calculation (min): 45 min       Assessment/Plan   PT Assessment  End of Session Communication: Bedside nurse  End of Session Patient Position: Bed, 3 rail up, Alarm on  PT Plan  Inpatient/Swing Bed or Outpatient: Inpatient  PT Plan  Treatment/Interventions: Transfer training, Bed mobility, Gait training, Therapeutic exercise  PT Plan: Skilled PT  PT Frequency: BID  PT Discharge Recommendations: Low intensity level of continued care  Equipment Recommended upon Discharge:  (pt has FWW and cane available)  PT Recommended Transfer Status: Assist x1, Assistive device  PT - OK to Discharge: Yes (per PT POC)      General Visit Information:   PT  Visit  PT Received On: 02/14/24  General  Reason for Referral: impaired mobility s/p R KENNY  Family/Caregiver Present: Yes  Caregiver Feedback:  and Son present  Patient Position Received: Bed, 3 rail up, Alarm on  Preferred Learning Style: kinesthetic, verbal, visual  General Comment:  (pt agreeable to tx.)    Subjective   Precautions:     Vital Signs:  Vital Signs  Heart Rate: 73  BP: 133/50  Patient Position: Lying (133/50 supine,144/61 seated,106/55 standing asymptomatic,episode of dizziness in restroom BP dropped to 78/41 RN notified see details in mobility comments)    Objective   Pain:  Pain Assessment  Pain Assessment: 0-10  Pain Score: 5 - Moderate pain  Cognition:     Postural Control:     Extremity/Trunk Assessments:    Activity Tolerance:  Activity Tolerance  Endurance: Decreased tolerance for upright activites  Treatments:  Therapeutic Exercise  Therapeutic Exercise Performed: Yes  Therapeutic Exercise Activity 1: pt performed KENNY exercises on RLE (AP, QS, GS, HS, ABD, SAQ, LAQ) x15 pt req cues for proper breathing and technique         Bed Mobility  Bed Mobility: Yes  Bed Mobility 1  Bed  Mobility 1: Supine to sitting, Sitting to supine  Level of Assistance 1: Minimum assistance  Bed Mobility Comments 1:  (pt req min cues for properhand placement and sequencing.)    Ambulation/Gait Training  Ambulation/Gait Training Performed: Yes  Ambulation/Gait Training 1  Surface 1: Level tile  Device 1: Rolling walker  Gait Support Devices: Gait belt  Assistance 1: Contact guard  Quality of Gait 1: Narrow base of support, Diminished heel strike, Inconsistent stride length, Decreased step length, Antalgic  Comments/Distance (ft) 1: 7ft x 2 (pt req min cues for proper FWW placement and sequecing. slow short step to pattern.)  Transfers  Transfer: Yes  Transfer 1  Transfer From 1: Sit to, Stand to  Transfer Device 1: Walker, Gait belt  Transfer Level of Assistance 1: Set up, Minimum assistance, Moderate verbal cues, Moderate tactile cues  Trials/Comments 1: 3 (pt req multiple hand placement, pt became more dizzy after 3rd trial of standing from toilet, pt returned to sitting, BP taken, RN notified, pt transfer to chair wiht arm rests, brought over to bed then pivot to bed while maintaining hip precautions.)    Outcome Measures:  [facility] Basic Mobility  Turning from your back to your side while in a flat bed without using bedrails: A little  Moving from lying on your back to sitting on the side of a flat bed without using bedrails: A little  Moving to and from bed to chair (including a wheelchair): A little  Standing up from a chair using your arms (e.g. wheelchair or bedside chair): A little  To walk in hospital room: A little  Climbing 3-5 steps with railing: A lot  Basic Mobility - Total Score: 17    Education Documentation  Handouts, taught by Ulices Spring PTA at 2/14/2024  1:35 PM.  Learner: Family, Patient  Readiness: Acceptance  Method: Explanation  Response: Needs Reinforcement    Precautions, taught by Ulices Spring PTA at 2/14/2024  1:35 PM.  Learner: Family, Patient  Readiness:  Acceptance  Method: Explanation  Response: Needs Reinforcement    Body Mechanics, taught by Ulices Spring PTA at 2/14/2024  1:35 PM.  Learner: Family, Patient  Readiness: Acceptance  Method: Explanation  Response: Needs Reinforcement    Home Exercise Program, taught by Ulices Spring PTA at 2/14/2024  1:35 PM.  Learner: Family, Patient  Readiness: Acceptance  Method: Explanation  Response: Needs Reinforcement    Mobility Training, taught by Ulices Spring PTA at 2/14/2024  1:35 PM.  Learner: Family, Patient  Readiness: Acceptance  Method: Explanation  Response: Needs Reinforcement    Education Comments  No comments found.        OP EDUCATION:       Encounter Problems       Encounter Problems (Active)       PT Problem       PT Goal 1 - bed mobility  (Progressing)       Start:  02/13/24    Expected End:  02/16/24       Pt will complete bed mobility with HOB flat, Mod I with adherence to precautions.           PT Goal 2 - transfers (Progressing)       Start:  02/13/24    Expected End:  02/16/24       1) Patient will complete all transfers with mod I and LRAD, adhering to KENNY precautions independently, from all surfaces/heights.  2) Patient will complete car transfer with proper sequencing and SBA, adhering to KENNY precautions.            PT Goal 3 - gait (Progressing)       Start:  02/13/24    Expected End:  02/16/24       Patient will ambulate >250 feet with LRAD and mod I, using reciprocal steady pattern, and adhering to KENNY precautions.          PT Goal 4 - stairs (Progressing)       Start:  02/13/24    Expected End:  02/16/24       1) Patient will ascend/descend 1 entry stair with cane support and SBA, with adherence to precautions and safe technique.  2) Patient will ascend/descend 12 stairs with SBA, HR on L and SPC, demonstrating safe technique and adhering to KENNY precautions            PT Goal 5 - HEP and strength (Progressing)       Start:  02/13/24    Expected End:  02/16/24       1)  Patient will demonstrate independence with KENNY HEP x20 reps with proper sequencing.  2) Patient will demonstrate >4/5 strength and independent RLE activation with activity and therex.

## 2024-02-14 NOTE — PROGRESS NOTES
Pharmacy Medication History Review    Mera Ansari is a 83 y.o. female admitted for Unilateral primary osteoarthritis, right hip. Pharmacy reviewed the patient's [id number] medications and allergies for accuracy.    The list below reflectives the updated PTA list. Please review each medication in order reconciliation for additional clarification and justification.  The list below reflectives the updated allergy list. Please review each documented allergy for additional clarification and justification.  Allergies  Reviewed by Hazel Ge RN on 2/13/2024        Severity Reactions Comments    Bee Pollen Low Runny nose Sneezing and Itching    Cat Dander Low Runny nose     Cat Hair Standardized Allergenic Extract Low Runny nose     Dog Dander Low Runny nose             Below are additional concerns with the patient's PTA list.  Prior to Admission Medications   Prescriptions Last Dose Informant Patient Reported? Taking?   acetaminophen (Tylenol) 500 mg tablet 2/12/2024  Yes Yes   Sig: Take 1 tablet (500 mg) by mouth once daily as needed for mild pain (1 - 3).   aspirin 81 mg EC tablet 2/12/2024  Yes Yes   Sig: Take by mouth.   atorvastatin (Lipitor) 10 mg tablet 2/12/2024  No Yes   Sig: TAKE 1 TABLET DAILY AS DIRECTED   chlorhexidine (Peridex) 0.12 % solution Not Taking  No No   Sig: 15 ml swish and spit for 30 seconds night prior to surgery and morning of surgery   Patient not taking: Reported on 2/14/2024   cholecalciferol (Vitamin D-3) 25 MCG (1000 UT) tablet Past Week  Yes Yes   Sig: Take 1 tablet (25 mcg) by mouth once daily.   ibuprofen 200 mg tablet Past Month  Yes Yes   Sig: Take 2 tablets (400 mg) by mouth every 8 hours if needed for mild pain (1 - 3).   levothyroxine (Synthroid, Levoxyl) 88 mcg tablet 2/12/2024  No Yes   Sig: TAKE 1 TABLET DAILY AS DIRECTED   traMADol (Ultram) 50 mg tablet Not Taking  No No   Sig: Take 1 tablet (50 mg) by mouth every 8 hours if needed for severe pain (7 - 10).    Patient not taking: Reported on 2/6/2024   ubidecarenone (COENZYME Q10, BULK, MISC) Past Week  Yes Yes   Sig: Take by mouth.      Facility-Administered Medications: None    Per preaadmission papers 2/6/24    Temitope Whelan CPhT

## 2024-02-14 NOTE — PROGRESS NOTES
[facility] Hospitalist Progress Note        Mera Ansari    :  1940(83 y.o.)    MRN:  [id number]  Date: 24     Assessment and Plan:     Vasovagal syncope  Orthostatic hypotension  Right hip OA s/p right hip total arthroplasty  HLD  Hypothyroidism     Plan:   - Vasovagal syncope in setting of post op pain after urinating. EKG without acute ST-T changes. Orthostatics positive this AM. Recurrent vasovagal episode today after urinating. Will trial flornief due to recurrent episodes and outside of oxycodone for post op pain no other meds which would contribute to orthostatic hypotension.  - PT/OT to re-eval tomorrow AM  - cortes tylenol, lidocaine patch. Oxycodone prn.     DVT Prophylaxis: subcutaneous Lovenox    Disposition: await clinical improvement; send florinef to meds to beds tomorrow if it helps today    The patient/family had opportunity to ask questions. All questions were answered to the best of my ability.    Between 7AM-7PM please message me via Epic Secure Chat.  After 7PM please page Nocturnist on call.    Electronically signed by Jovon Cunningham DO on 24 at 1:33 PM     Subjective:      Interval History:   Vitals and chart notes from overnight reviewed.   No acute issues overnight.   Patient seen and evaluated at bedside.     Had another syncopal episode today. PT checked orthostatics. 133/50 (71) - lying, 144/61 (79) - sitting, 106/55 (65) - standing. Asymptomatic but then went to bathroom and urinated. No BM, was not straining. Was going to get up off toilet and felt dizzy. PT checked BP 78/41 (49). She was taken back to bed and BP when back to bed 133/55 (76)     Review of Systems:   Other than patient's chronic conditions and those complaints in the history above, the rest of the 10 systems review were done and were negative.     Current medications:  Scheduled Meds:acetaminophen, 975 mg, oral, TID  atorvastatin, 10 mg, oral, Daily  enoxaparin, 40 mg, subcutaneous, q24h  fludrocortisone, 0.1 mg,  oral, Daily  levothyroxine, 88 mcg, oral, Daily  lidocaine, 1 patch, transdermal, Daily  polyethylene glycol, 17 g, oral, Daily      Continuous Infusions:   PRN Meds:PRN medications: ondansetron, oxyCODONE      Objective:     Vitals:    02/14/24 1045 02/14/24 1053 02/14/24 1055 02/14/24 1100   BP: 106/55 (!) 78/41 (!) 83/39 133/55   BP Location: Right arm Right arm Right arm Right arm   Patient Position: Standing Sitting Sitting Lying   Pulse: 81 66 61 60   Resp:       Temp:       TempSrc:       SpO2:       Weight:       Height:            Physical Exam  Vitals and nursing note reviewed.   HENT:      Mouth/Throat:      Mouth: Mucous membranes are moist.      Pharynx: Oropharynx is clear.   Cardiovascular:      Rate and Rhythm: Normal rate and regular rhythm.      Heart sounds: No murmur heard.  Pulmonary:      Effort: Pulmonary effort is normal.   Abdominal:      Palpations: Abdomen is soft.      Tenderness: There is no abdominal tenderness. There is no guarding.   Musculoskeletal:      Right lower leg: No edema.      Left lower leg: No edema.   Neurological:      General: No focal deficit present.      Mental Status: She is alert and oriented to person, place, and time.         Labs:   Lab Results   Component Value Date     (L) 02/14/2024    K 4.2 02/14/2024     02/14/2024    CO2 26 02/14/2024    BUN 14 02/14/2024    CREATININE 0.58 02/14/2024    GLUCOSE 112 (H) 02/14/2024    CALCIUM 8.6 02/14/2024    PROT 6.9 05/31/2023    BILITOT 0.8 05/31/2023    ALKPHOS 53 05/31/2023    AST 22 05/31/2023    ALT 15 05/31/2023       Lab Results   Component Value Date    WBC 8.9 02/14/2024    HGB 11.5 (L) 02/14/2024    HCT 33.0 (L) 02/14/2024    MCV 89 02/14/2024     02/14/2024

## 2024-02-14 NOTE — PROGRESS NOTES
Physical Therapy    Physical Therapy Treatment    Patient Name: Mera Ansari  MRN: [id number]  Today's Date: 2/14/2024  Time Calculation  Start Time: 1512  Stop Time: 1551  Time Calculation (min): 39 min       Assessment/Plan   PT Assessment  End of Session Communication: Bedside nurse  End of Session Patient Position: Bed, 3 rail up, Alarm on  PT Plan  Inpatient/Swing Bed or Outpatient: Inpatient  PT Plan  Treatment/Interventions: Bed mobility, Transfer training, Gait training, Therapeutic exercise  PT Plan: Skilled PT  PT Frequency: BID  PT Discharge Recommendations: Low intensity level of continued care  Equipment Recommended upon Discharge:  (pt has FWW and cane available)  PT Recommended Transfer Status: Assist x1, Assistive device  PT - OK to Discharge: Yes (per PT POC)      General Visit Information:   PT  Visit  PT Received On: 02/14/24  General  Reason for Referral: impaired mobility s/p R KENNY  Family/Caregiver Present: Yes  Caregiver Feedback:  ()  Prior to Session Communication: Bedside nurse  Patient Position Received: Up in chair, Alarm on  Preferred Learning Style: kinesthetic, verbal, visual  General Comment:  (pt agreeable to tx.)    Subjective   Precautions:  Precautions  LE Weight Bearing Status: Weight Bearing as Tolerated  Medical Precautions: Fall precautions  Post-Surgical Precautions: Right hip precautions  Vital Signs:  Vital Signs  Heart Rate: 73  BP: 142/51 (142/51 seated,138/54 standing,125/53 standing,135/57 seated asymptomatic)  Patient Position: Lying (133/50 supine,144/61 seated,106/55 standing asymptomatic,episode of dizziness in restroom BP dropped to 78/41 RN notified see details in mobility comments)    Objective   Pain:  Pain Assessment  Pain Assessment: 0-10  Pain Score: 3  Cognition:     Postural Control:     Extremity/Trunk Assessments:    Activity Tolerance:  Activity Tolerance  Endurance: Decreased tolerance for upright activites  Treatments:  Therapeutic  Exercise  Therapeutic Exercise Performed: Yes  Therapeutic Exercise Activity 1: pt performed KENNY exercises on RLE (AP, QS, GS, HS, ABD, SAQ, LAQ) x15 pt req cues for proper breathing and technique         Bed Mobility  Bed Mobility: Yes  Bed Mobility 1  Bed Mobility 1: Supine to sitting, Sitting to supine  Level of Assistance 1: Minimum assistance  Bed Mobility Comments 1:  (pt req min cues for properhand placement and sequencing.)    Ambulation/Gait Training  Ambulation/Gait Training Performed: Yes  Ambulation/Gait Training 1  Surface 1: Level tile  Device 1: Rolling walker  Gait Support Devices: Gait belt  Assistance 1: Contact guard  Quality of Gait 1: Narrow base of support, Diminished heel strike, Inconsistent stride length, Decreased step length, Antalgic  Comments/Distance (ft) 1: 5ft x 2+10ft x 2 (pt req increased cues fo rproper FWW placement, slow short step to pattern)  Transfers  Transfer: Yes  Transfer 1  Transfer From 1: Sit to, Stand to  Technique 1: Sit to stand  Transfer Device 1: Walker, Gait belt  Transfer Level of Assistance 1: Set up, Minimum assistance, Moderate verbal cues, Moderate tactile cues  Trials/Comments 1: 3 (pt req multiple hand placement, pt became more dizzy after 3rd trial of standing from toilet, pt returned to sitting, BP taken, RN notified, pt transfer to chair wiht arm rests, brought over to bed then pivot to bed while maintaining hip precautions.)    Outcome Measures:  [facility] Basic Mobility  Turning from your back to your side while in a flat bed without using bedrails: A little  Moving from lying on your back to sitting on the side of a flat bed without using bedrails: A little  Moving to and from bed to chair (including a wheelchair): A little  Standing up from a chair using your arms (e.g. wheelchair or bedside chair): A little  To walk in hospital room: A little  Climbing 3-5 steps with railing: Total  Basic Mobility - Total Score: 16    Education Documentation  Handouts,  taught by Ulices Spring PTA at 2/14/2024  4:35 PM.  Learner: Patient  Readiness: Acceptance  Method: Explanation  Response: Verbalizes Understanding    Precautions, taught by Ulices Spring PTA at 2/14/2024  4:35 PM.  Learner: Patient  Readiness: Acceptance  Method: Explanation  Response: Verbalizes Understanding    Body Mechanics, taught by Ulices Spring PTA at 2/14/2024  4:35 PM.  Learner: Patient  Readiness: Acceptance  Method: Explanation  Response: Verbalizes Understanding    Home Exercise Program, taught by Ulices Spring PTA at 2/14/2024  4:35 PM.  Learner: Patient  Readiness: Acceptance  Method: Explanation  Response: Verbalizes Understanding    Mobility Training, taught by Ulices Spring PTA at 2/14/2024  4:35 PM.  Learner: Patient  Readiness: Acceptance  Method: Explanation  Response: Verbalizes Understanding    Handouts, taught by Ulices Spring PTA at 2/14/2024  1:35 PM.  Learner: Family, Patient  Readiness: Acceptance  Method: Explanation  Response: Needs Reinforcement    Precautions, taught by Ulices Spring PTA at 2/14/2024  1:35 PM.  Learner: Family, Patient  Readiness: Acceptance  Method: Explanation  Response: Needs Reinforcement    Body Mechanics, taught by Ulices Spring PTA at 2/14/2024  1:35 PM.  Learner: Family, Patient  Readiness: Acceptance  Method: Explanation  Response: Needs Reinforcement    Home Exercise Program, taught by Ulices Spring PTA at 2/14/2024  1:35 PM.  Learner: Family, Patient  Readiness: Acceptance  Method: Explanation  Response: Needs Reinforcement    Mobility Training, taught by Ulices Spring PTA at 2/14/2024  1:35 PM.  Learner: Family, Patient  Readiness: Acceptance  Method: Explanation  Response: Needs Reinforcement    Education Comments  No comments found.        OP EDUCATION:       Encounter Problems       Encounter Problems (Active)       PT Problem       PT Goal 1 - bed mobility  (Progressing)        Start:  02/13/24    Expected End:  02/16/24       Pt will complete bed mobility with HOB flat, Mod I with adherence to precautions.           PT Goal 2 - transfers (Progressing)       Start:  02/13/24    Expected End:  02/16/24       1) Patient will complete all transfers with mod I and LRAD, adhering to KENNY precautions independently, from all surfaces/heights.  2) Patient will complete car transfer with proper sequencing and SBA, adhering to KENNY precautions.            PT Goal 3 - gait (Progressing)       Start:  02/13/24    Expected End:  02/16/24       Patient will ambulate >250 feet with LRAD and mod I, using reciprocal steady pattern, and adhering to KENNY precautions.          PT Goal 4 - stairs (Progressing)       Start:  02/13/24    Expected End:  02/16/24       1) Patient will ascend/descend 1 entry stair with cane support and SBA, with adherence to precautions and safe technique.  2) Patient will ascend/descend 12 stairs with SBA, HR on L and SPC, demonstrating safe technique and adhering to KENNY precautions            PT Goal 5 - HEP and strength (Progressing)       Start:  02/13/24    Expected End:  02/16/24       1) Patient will demonstrate independence with KENNY HEP x20 reps with proper sequencing.  2) Patient will demonstrate >4/5 strength and independent RLE activation with activity and therex.

## 2024-02-15 ENCOUNTER — APPOINTMENT (OUTPATIENT)
Dept: CARDIOLOGY | Facility: HOSPITAL | Age: 84
DRG: 470 | End: 2024-02-15
Payer: MEDICARE

## 2024-02-15 VITALS
BODY MASS INDEX: 20.08 KG/M2 | TEMPERATURE: 98 F | SYSTOLIC BLOOD PRESSURE: 142 MMHG | DIASTOLIC BLOOD PRESSURE: 70 MMHG | WEIGHT: 113.32 LBS | OXYGEN SATURATION: 95 % | HEART RATE: 84 BPM | HEIGHT: 63 IN | RESPIRATION RATE: 16 BRPM

## 2024-02-15 PROCEDURE — 2500000004 HC RX 250 GENERAL PHARMACY W/ HCPCS (ALT 636 FOR OP/ED): Performed by: HOSPITALIST

## 2024-02-15 PROCEDURE — 99239 HOSP IP/OBS DSCHRG MGMT >30: CPT | Performed by: STUDENT IN AN ORGANIZED HEALTH CARE EDUCATION/TRAINING PROGRAM

## 2024-02-15 PROCEDURE — 99232 SBSQ HOSP IP/OBS MODERATE 35: CPT | Performed by: STUDENT IN AN ORGANIZED HEALTH CARE EDUCATION/TRAINING PROGRAM

## 2024-02-15 PROCEDURE — 97110 THERAPEUTIC EXERCISES: CPT | Mod: GP,CQ | Performed by: PHYSICAL THERAPY ASSISTANT

## 2024-02-15 PROCEDURE — 2500000001 HC RX 250 WO HCPCS SELF ADMINISTERED DRUGS (ALT 637 FOR MEDICARE OP): Performed by: HOSPITALIST

## 2024-02-15 PROCEDURE — 97116 GAIT TRAINING THERAPY: CPT | Mod: GP,CQ | Performed by: PHYSICAL THERAPY ASSISTANT

## 2024-02-15 PROCEDURE — 2500000002 HC RX 250 W HCPCS SELF ADMINISTERED DRUGS (ALT 637 FOR MEDICARE OP, ALT 636 FOR OP/ED): Performed by: HOSPITALIST

## 2024-02-15 PROCEDURE — 2500000005 HC RX 250 GENERAL PHARMACY W/O HCPCS: Performed by: HOSPITALIST

## 2024-02-15 PROCEDURE — 93005 ELECTROCARDIOGRAM TRACING: CPT

## 2024-02-15 PROCEDURE — 97530 THERAPEUTIC ACTIVITIES: CPT | Mod: GP,CQ | Performed by: PHYSICAL THERAPY ASSISTANT

## 2024-02-15 RX ORDER — FLUDROCORTISONE ACETATE 0.1 MG/1
0.1 TABLET ORAL DAILY
Qty: 90 TABLET | Refills: 0 | Status: SHIPPED | OUTPATIENT
Start: 2024-02-16

## 2024-02-15 RX ADMIN — LEVOTHYROXINE SODIUM 88 MCG: 88 TABLET ORAL at 09:05

## 2024-02-15 RX ADMIN — OXYCODONE HYDROCHLORIDE 5 MG: 5 TABLET ORAL at 14:06

## 2024-02-15 RX ADMIN — FLUDROCORTISONE ACETATE 0.1 MG: 0.1 TABLET ORAL at 09:05

## 2024-02-15 RX ADMIN — ACETAMINOPHEN 975 MG: 325 TABLET ORAL at 09:05

## 2024-02-15 RX ADMIN — ACETAMINOPHEN 975 MG: 325 TABLET ORAL at 14:05

## 2024-02-15 RX ADMIN — OXYCODONE HYDROCHLORIDE 5 MG: 5 TABLET ORAL at 04:10

## 2024-02-15 RX ADMIN — ATORVASTATIN CALCIUM 10 MG: 10 TABLET, FILM COATED ORAL at 09:05

## 2024-02-15 RX ADMIN — LIDOCAINE 1 PATCH: 4 PATCH TOPICAL at 09:07

## 2024-02-15 RX ADMIN — POLYETHYLENE GLYCOL 3350 17 G: 17 POWDER, FOR SOLUTION ORAL at 09:05

## 2024-02-15 ASSESSMENT — COGNITIVE AND FUNCTIONAL STATUS - GENERAL
CLIMB 3 TO 5 STEPS WITH RAILING: A LITTLE
MOVING FROM LYING ON BACK TO SITTING ON SIDE OF FLAT BED WITH BEDRAILS: A LITTLE
WALKING IN HOSPITAL ROOM: A LITTLE
STANDING UP FROM CHAIR USING ARMS: A LITTLE
MOBILITY SCORE: 18
MOVING TO AND FROM BED TO CHAIR: A LITTLE
TURNING FROM BACK TO SIDE WHILE IN FLAT BAD: A LITTLE

## 2024-02-15 ASSESSMENT — PAIN - FUNCTIONAL ASSESSMENT
PAIN_FUNCTIONAL_ASSESSMENT: 0-10

## 2024-02-15 ASSESSMENT — ENCOUNTER SYMPTOMS
FEVER: 0
ABDOMINAL DISTENTION: 0
VOMITING: 0
ABDOMINAL PAIN: 0
SHORTNESS OF BREATH: 0

## 2024-02-15 ASSESSMENT — PAIN SCALES - GENERAL
PAINLEVEL_OUTOF10: 2
PAINLEVEL_OUTOF10: 2
PAINLEVEL_OUTOF10: 5 - MODERATE PAIN

## 2024-02-15 ASSESSMENT — PAIN DESCRIPTION - ORIENTATION: ORIENTATION: RIGHT

## 2024-02-15 NOTE — PROGRESS NOTES
Physical Therapy    Physical Therapy Treatment    Patient Name: Mera Ansari  MRN: [id number]  Today's Date: 2/15/2024  Time Calculation  Start Time: 0948  Stop Time: 1033  Time Calculation (min): 45 min       Assessment/Plan   PT Assessment  End of Session Communication: Bedside nurse  End of Session Patient Position: Alarm on, Up in chair  PT Plan  Inpatient/Swing Bed or Outpatient: Inpatient  PT Plan  Treatment/Interventions: Transfer training, Gait training, Stair training, Therapeutic exercise, Therapeutic activity  PT Plan: Skilled PT  PT Frequency: BID  PT Discharge Recommendations: Low intensity level of continued care  Equipment Recommended upon Discharge:  (pt has FWW and cane available)  PT Recommended Transfer Status: Assist x1, Assistive device  PT - OK to Discharge: Yes (per PT POC)      General Visit Information:   PT  Visit  PT Received On: 02/15/24  General  Reason for Referral: impaired mobility s/p R KENNY  Family/Caregiver Present: No  Caregiver Feedback:  ()  Prior to Session Communication: Bedside nurse  Patient Position Received: Up in chair, Alarm on  Preferred Learning Style: kinesthetic, verbal, visual  General Comment:  (pt agreeable to tx.)    Subjective   Precautions:  Precautions  LE Weight Bearing Status: Weight Bearing as Tolerated  Medical Precautions: Fall precautions  Post-Surgical Precautions: Right hip precautions  Vital Signs:  Vital Signs  BP: 133/58 (pt with consistent 130/60's BP thorugh session.)    Objective   Pain:  Pain Assessment  Pain Assessment: 0-10  Pain Score: 2  Pain Type: Surgical pain  Pain Location: Hip  Pain Orientation: Right  Cognition:     Postural Control:     Extremity/Trunk Assessments:    Activity Tolerance:     Treatments:  Therapeutic Exercise  Therapeutic Exercise Performed: Yes  Therapeutic Exercise Activity 1: pt performed KENNY exercises on RLE (AP, QS, GS, HS, ABD, SAQ, LAQ) x20 pt req cues for proper breathing and technique               Ambulation/Gait Training  Ambulation/Gait Training Performed: Yes  Ambulation/Gait Training 1  Surface 1: Level tile  Device 1: Rolling walker  Gait Support Devices: Gait belt  Assistance 1: Close supervision  Quality of Gait 1: Narrow base of support, Diminished heel strike, Inconsistent stride length, Decreased step length, Antalgic  Comments/Distance (ft) 1: 65ft+30ft x 2 (pt demo slow short step through apttern wiht cues, wheeled chair follow.)  Transfers  Transfer: Yes  Transfer 1  Transfer From 1: Sit to, Stand to  Transfer to 1: Sit, Stand  Technique 1: Sit to stand  Transfer Device 1: Walker, Gait belt  Transfer Level of Assistance 1: Close supervision  Trials/Comments 1:  (pt req min cues for proper hand placement)  Transfers 2  Trials/Comments 2:  (performed car transfer simulation with cues for proper hand placement and sequencing)    Stairs  Stairs:  (pt performed 1 curb step with FWW and 4 steps with 1 rail and 1 cane with cues for proper hand placement and sequencing. CGA)    Outcome Measures:  [facility] Basic Mobility  Turning from your back to your side while in a flat bed without using bedrails: A little  Moving from lying on your back to sitting on the side of a flat bed without using bedrails: A little  Moving to and from bed to chair (including a wheelchair): A little  Standing up from a chair using your arms (e.g. wheelchair or bedside chair): A little  To walk in hospital room: A little  Climbing 3-5 steps with railing: A little  Basic Mobility - Total Score: 18    Education Documentation  Handouts, taught by Ulices Spring PTA at 2/15/2024 11:02 AM.  Learner: Patient  Readiness: Acceptance  Method: Explanation  Response: Verbalizes Understanding    Precautions, taught by Ulices Spring PTA at 2/15/2024 11:02 AM.  Learner: Patient  Readiness: Acceptance  Method: Explanation  Response: Verbalizes Understanding    Body Mechanics, taught by Ulices Spring PTA at 2/15/2024  11:02 AM.  Learner: Patient  Readiness: Acceptance  Method: Explanation  Response: Verbalizes Understanding    Home Exercise Program, taught by Ulices Spring PTA at 2/15/2024 11:02 AM.  Learner: Patient  Readiness: Acceptance  Method: Explanation  Response: Verbalizes Understanding    Mobility Training, taught by Ulices Spring PTA at 2/15/2024 11:02 AM.  Learner: Patient  Readiness: Acceptance  Method: Explanation  Response: Verbalizes Understanding    Education Comments  No comments found.        OP EDUCATION:       Encounter Problems       Encounter Problems (Active)       PT Problem       PT Goal 1 - bed mobility  (Progressing)       Start:  02/13/24    Expected End:  02/16/24       Pt will complete bed mobility with HOB flat, Mod I with adherence to precautions.           PT Goal 2 - transfers (Progressing)       Start:  02/13/24    Expected End:  02/16/24       1) Patient will complete all transfers with mod I and LRAD, adhering to KENNY precautions independently, from all surfaces/heights.  2) Patient will complete car transfer with proper sequencing and SBA, adhering to KENNY precautions.            PT Goal 3 - gait (Progressing)       Start:  02/13/24    Expected End:  02/16/24       Patient will ambulate >250 feet with LRAD and mod I, using reciprocal steady pattern, and adhering to KENNY precautions.          PT Goal 4 - stairs (Progressing)       Start:  02/13/24    Expected End:  02/16/24       1) Patient will ascend/descend 1 entry stair with cane support and SBA, with adherence to precautions and safe technique.  2) Patient will ascend/descend 12 stairs with SBA, HR on L and SPC, demonstrating safe technique and adhering to KENNY precautions            PT Goal 5 - HEP and strength (Progressing)       Start:  02/13/24    Expected End:  02/16/24       1) Patient will demonstrate independence with KENNY HEP x20 reps with proper sequencing.  2) Patient will demonstrate >4/5 strength and independent  RLE activation with activity and therex.

## 2024-02-15 NOTE — PROGRESS NOTES
Mera Ansari is a 83 y.o. female on day 1 of admission presenting with Unilateral primary osteoarthritis, right hip.      Subjective   MARIELLA. Reports feeling better today. Assessed after ambulating to bathroom and patient denies dizziness/lightheadedness. Tolerating diet, pain controlled.     Denies N/T, F/C, CP, SOB, N/V      Objective     PE:  Constitutional: A&Ox3, calm and cooperative, NAD  Eyes: EOMI, clear sclera   Cardiovascular: Normal rate and regular rhythm. No murmurs  Respiratory/Thorax: CTAB, on RA  Genitourinary: Voiding independently   Musculoskeletal: right hip mepilex CDI w/o surrounding erythema or drainage. fires EHL/FHL/TA/TP/EDL/FDL. SILT in SP/DP/T distribution. 2+ DP/PT, <2 CRT. Compartments soft, compressible.  Extremities: TEDs and SCDs in place  Neurological: A&Ox3, No focal deficits   Psychological: Appropriate mood and behavior      Last Recorded Vitals  Vitals:    02/14/24 1953 02/14/24 2357 02/15/24 0355 02/15/24 0733   BP: 142/72 124/54 136/61 143/64   BP Location: Right arm Right arm Right arm    Patient Position: Lying Lying Lying    Pulse: 65 69 73 72   Resp: 16 16 16 16   Temp: 36.6 °C (97.9 °F) 36.3 °C (97.3 °F) 36.8 °C (98.3 °F) 36 °C (96.8 °F)   TempSrc: Temporal Temporal Temporal Temporal   SpO2: 96% 94% 94% 96%   Weight:       Height:             Relevant Results    Imaging:     .=== 02/13/24 ===    XR PELVIS 1-2 VIEWS    - Impression -  Anatomic alignment status post right total hip arthroplasty.  Signed by Jose Manuel Jung MD   .=== 05/29/20 ===    CT NECK ANGIO W AND WO IV CONTRAST    - Impression -  1. Unchanged irregularity of the distal V2 segments bilaterally, the  major cervical vessels are otherwise normal in course and caliber.  The imaging appearance is most suggestive of a connective tissue  disorder such as fibromuscular dysplasia.  2. No flow-limiting stenosis or occlusion in the neck.         Lab Results:   Lab Results   Component Value Date    WBC 8.9 02/14/2024     "HGB 11.5 (L) 02/14/2024    HCT 33.0 (L) 02/14/2024    MCV 89 02/14/2024     02/14/2024     Lab Results   Component Value Date    GLUCOSE 112 (H) 02/14/2024    CALCIUM 8.6 02/14/2024     (L) 02/14/2024    K 4.2 02/14/2024    CO2 26 02/14/2024     02/14/2024    BUN 14 02/14/2024    CREATININE 0.58 02/14/2024         Estimated Creatinine Clearance: 60.7 mL/min (by C-G formula based on SCr of 0.58 mg/dL).  No results found for: \"CRP\"      Assessment/Plan   Mera Ansari is a 83 y.o. female that underwent elective Right KENNY complicated by vasovagal syncope prompting admission under medicine. Patient had syncopal episode POD0 while going to bathroom and again yesterday (POD1) when she stood up from the toilet with PT. Medicine trialing florinef     POD2 R KENNY complicated by vasovagal syncope   A/P:  - Afebrile, VSS,   - WBAT with FWW, PHP  - PT/OOB/ mobilize  - Regular diet, miralax as needed for constipation  - DVT prophylaxis, MOUSTAPHA/SCD/currently on lovenox. Will discharge with 4 weeks of aspirin 81 mg twice daily   - Continue current pain control regimen  - Ice pack in place, maintain barrier to protect skin   - follow up in 2 weeks     Dispo: Discussed with Dr. Macias. Anticipate discharge today pending PT eval and orthostatic vitals       I spent 35 minutes in the professional and overall care of this patient.      Rosario Fernandez PA-C           "

## 2024-02-15 NOTE — CARE PLAN
The patient's goals for the shift include safety    The clinical goals for the shift include safety, pain management      Problem: Fall/Injury  Goal: Not fall by end of shift  Outcome: Progressing  Goal: Be free from injury by end of the shift  Outcome: Progressing     Problem: Pain  Goal: Walks with improved pain control throughout the shift  Outcome: Progressing

## 2024-02-15 NOTE — DISCHARGE SUMMARY
[facility] Hospitalist Discharge Summary        Mera Rawls: 1940MRN: [id number]    ADMIT DATE: ISCHARGE DATE: 2/15/2024    PRIMARYCARE PHYSICIAN: Fifi Hou MD    VISIT STATUS: Inpatient    CODE STATUS: Full Code    DISCHARGE DIAGNOSES:    Principal Problem:    Unilateral primary osteoarthritis, right hip  Active Problems:    Syncope, vasovagal      CONSULTANTS:  Ortho    HOSPITAL COURSE:    Admitted for syncopal event following outpatient elective hip replacement. Was suspected to be 2/2 vasovagal syncope vs orthostatic hypotension. She was trialed on florinef and did well today with PT. She is medically stable for DC home today with close outpatient follow up.    DAY OF DISCHARGE:  Review of Systems   Constitutional:  Negative for fever.   Respiratory:  Negative for shortness of breath.    Cardiovascular:  Negative for chest pain.   Gastrointestinal:  Negative for abdominal distention, abdominal pain and vomiting.       Patient Vitals for the past 24 hrs:   BP Temp Temp src Pulse Resp SpO2   02/15/24 0948 133/58 -- -- -- -- --   02/15/24 0733 143/64 36 °C (96.8 °F) Temporal 72 16 96 %   02/15/24 0355 136/61 36.8 °C (98.3 °F) Temporal 73 16 94 %   24 2357 124/54 36.3 °C (97.3 °F) Temporal 69 16 94 %   24 1953 142/72 36.6 °C (97.9 °F) Temporal 65 16 96 %   24 1732 122/59 -- -- 78 -- --   24 1730 138/54 -- -- 75 -- --   24 1626 129/76 36.2 °C (97.1 °F) Temporal 71 16 98 %   24 1512 142/51 -- -- -- -- --   24 1322 122/61 -- -- -- -- --       Average, Min, and Max forlast 24 hours Vitals:  TEMPERATURE: Temp  Av.4 °C (97.5 °F)  Min: 36 °C (96.8 °F)  Max: 36.8 °C (98.3 °F)    RESPIRATIONS RANGE: Resp  Av  Min: 16  Max: 16    PULSE RANGE: Pulse  Av.9  Min: 65  Max: 78    BLOOD PRESSURE RANGE: Systolic (24hrs), Av , Min:122 , Max:143   ; Diastolic (24hrs), Av, Min:51, Max:76      PULSE OXIMETRYRANGE: SpO2  Av.6 %  Min: 94 %  Max: 98  %    I/O last 3 completed shifts:  In: 1146.7 (22.3 mL/kg) [P.O.:240; I.V.:906.7 (17.6 mL/kg)]  Out: 1800 (35 mL/kg) [Urine:1800 (1 mL/kg/hr)]  Weight: 51.4 kg     Physical Exam  Constitutional:       General: She is not in acute distress.  Cardiovascular:      Rate and Rhythm: Normal rate and regular rhythm.   Pulmonary:      Effort: Pulmonary effort is normal.      Breath sounds: Normal breath sounds.   Abdominal:      General: There is no distension.      Palpations: Abdomen is soft.   Neurological:      Mental Status: She is alert. Mental status is at baseline.           Discharge Meds       Your medication list        START taking these medications        Instructions Last Dose Given Next Dose Due   aspirin 81 mg EC tablet      Take 1 tablet (81 mg) by mouth 2 times a day for 28 days. After this may resume home daily aspirin regimen.       docusate sodium 100 mg capsule  Commonly known as: Colace      Take 1 capsule (100 mg) by mouth 2 times a day as needed for constipation for up to 7 days.       fludrocortisone 0.1 mg tablet  Commonly known as: Florinef  Start taking on: February 16, 2024      Take 1 tablet (0.1 mg) by mouth once daily. Do not start before February 16, 2024.       ketorolac 10 mg tablet  Commonly known as: Toradol      Take 1 tablet (10 mg) by mouth every 8 hours if needed for moderate pain (4 - 6) for up to 3 days.       oxyCODONE 5 mg immediate release tablet  Commonly known as: Roxicodone      Take 1 tablet (5 mg) by mouth every 6 hours if needed for severe pain (7 - 10) for up to 7 days.              CONTINUE taking these medications        Instructions Last Dose Given Next Dose Due   atorvastatin 10 mg tablet  Commonly known as: Lipitor      TAKE 1 TABLET DAILY AS DIRECTED       cholecalciferol 25 MCG (1000 UT) tablet  Commonly known as: Vitamin D-3           COENZYME Q10 (BULK) MISC           levothyroxine 88 mcg tablet  Commonly known as: Synthroid, Levoxyl      TAKE 1 TABLET DAILY AS  DIRECTED              STOP taking these medications      acetaminophen 500 mg tablet  Commonly known as: Tylenol        chlorhexidine 0.12 % solution  Commonly known as: Peridex        ibuprofen 200 mg tablet                  Where to Get Your Medications        These medications were sent to [facility]/pharmacy #[id number] - [facility], [place] - [address] AT [facility]  [address], [facility] [address]      Phone: [phone]   fludrocortisone 0.1 mg tablet       These medications were sent to [facility]  [address], [address], [facility] [address]      Hours: 8 AM to 6 PM Mon-Fri, 9 AM to 1 PM Saturday Phone: [phone]   aspirin 81 mg EC tablet  docusate sodium 100 mg capsule  ketorolac 10 mg tablet  oxyCODONE 5 mg immediate release tablet           FOLLOW UP TESTING, PENDING RESULTS OR REFERRALS AT FOLLOW UP VISIT:   [X] Yes   [ ] No    DISPOSITION: Home    FACILITY/HOME CARE AGENCY NAME: NA    Follow up with PCP Fifi Hou MD    Outpatient Follow-Up Appointments  Future Appointments   Date Time Provider Department Center   2/16/2024 11:30 AM Subha Johnson OT [facility]   2/21/2024 To Be Determined Vania Cherry, PT [facility]   6/13/2024  8:20 AM Fifi Hou MD [id number] [facility]   1/20/2025  1:00 PM Ally Somers MD [id number]       I personally examined the patient and reviewed chart, data, labs and radiology reports.  Plan of care was discussed with patient and family, all questions answered.    DISCHARGE TIME: > 30 minutes providing counseling or in coordination of care. Total time on this day of visit includes record and documentation review before and after visit including documentation and time not explicitly included on EMR time stamp.    Joe Mercedes MD  [facility] Medicine

## 2024-02-15 NOTE — NURSING NOTE
Interdisciplinary team present: NP, PT, NM, CC, SW, Orthopedic Coordinator, and bedside RN.  Pain - controlled  Nausea - none  Discharge barrier - PT/OT eval  Discharge plan - home with [facility]  Discharge date/time -  2/15/24

## 2024-02-16 ENCOUNTER — HOME CARE VISIT (OUTPATIENT)
Dept: HOME HEALTH SERVICES | Facility: HOME HEALTH | Age: 84
End: 2024-02-16
Payer: MEDICARE

## 2024-02-16 PROCEDURE — G0152 HHCP-SERV OF OT,EA 15 MIN: HCPCS | Mod: HHH

## 2024-02-16 PROCEDURE — 1090000001 HH PPS REVENUE CREDIT

## 2024-02-16 PROCEDURE — 1090000002 HH PPS REVENUE DEBIT

## 2024-02-16 PROCEDURE — 169592 NO-PAY CLAIM PROCEDURE

## 2024-02-16 PROCEDURE — 0023 HH SOC

## 2024-02-17 PROCEDURE — 1090000002 HH PPS REVENUE DEBIT

## 2024-02-17 PROCEDURE — 1090000001 HH PPS REVENUE CREDIT

## 2024-02-18 VITALS
OXYGEN SATURATION: 97 % | SYSTOLIC BLOOD PRESSURE: 140 MMHG | TEMPERATURE: 97.3 F | HEART RATE: 68 BPM | DIASTOLIC BLOOD PRESSURE: 72 MMHG

## 2024-02-18 PROCEDURE — 1090000002 HH PPS REVENUE DEBIT

## 2024-02-18 PROCEDURE — 1090000001 HH PPS REVENUE CREDIT

## 2024-02-18 ASSESSMENT — ENCOUNTER SYMPTOMS
PAIN LOCATION - PAIN DURATION: FREQUENT
HIGHEST PAIN SEVERITY IN PAST 24 HOURS: 7/10
PAIN LOCATION: RIGHT HIP
PAIN LOCATION - PAIN FREQUENCY: FREQUENT
SUBJECTIVE PAIN PROGRESSION: WAXING AND WANING
PAIN LOCATION - EXACERBATING FACTORS: MOVEMENT
PAIN LOCATION - RELIEVING FACTORS: REST, PAIN MEDS
PAIN LOCATION - PAIN QUALITY: ACHE
PERSON REPORTING PAIN: PATIENT
PAIN: 1
LOWEST PAIN SEVERITY IN PAST 24 HOURS: 2/10
PAIN LOCATION - PAIN SEVERITY: 3/10

## 2024-02-18 ASSESSMENT — ACTIVITIES OF DAILY LIVING (ADL)
BATHING_CURRENT_FUNCTION: SUPERVISION
PHYSICAL_TRANSFERS_DEVICES: FWW
ENTERING_EXITING_HOME: NEEDS ASSISTANCE
PHYSICAL TRANSFERS ASSESSED: 1
CURRENT_FUNCTION: STAND BY ASSIST
BATHING ASSESSED: 1
DRESSING_LB_CURRENT_FUNCTION: MODERATE ASSIST

## 2024-02-19 ENCOUNTER — HOME CARE VISIT (OUTPATIENT)
Dept: HOME HEALTH SERVICES | Facility: HOME HEALTH | Age: 84
End: 2024-02-19
Payer: MEDICARE

## 2024-02-19 PROCEDURE — 1090000002 HH PPS REVENUE DEBIT

## 2024-02-19 PROCEDURE — 1090000001 HH PPS REVENUE CREDIT

## 2024-02-19 PROCEDURE — G0151 HHCP-SERV OF PT,EA 15 MIN: HCPCS | Mod: HHH

## 2024-02-19 SDOH — HEALTH STABILITY: PHYSICAL HEALTH: EXERCISE ACTIVITY: HEEL SLIDES

## 2024-02-19 SDOH — HEALTH STABILITY: PHYSICAL HEALTH: PHYSICAL EXERCISE: SUPINE

## 2024-02-19 SDOH — HEALTH STABILITY: PHYSICAL HEALTH: PHYSICAL EXERCISE: 10

## 2024-02-19 SDOH — HEALTH STABILITY: PHYSICAL HEALTH: EXERCISE ACTIVITY: HIP ABD

## 2024-02-19 SDOH — HEALTH STABILITY: PHYSICAL HEALTH: EXERCISE ACTIVITY: ANKLE PUMPS

## 2024-02-19 SDOH — HEALTH STABILITY: PHYSICAL HEALTH: EXERCISE ACTIVITY: LAQ

## 2024-02-19 SDOH — HEALTH STABILITY: PHYSICAL HEALTH: EXERCISE ACTIVITY: SAQ

## 2024-02-19 SDOH — HEALTH STABILITY: PHYSICAL HEALTH: EXERCISE ACTIVITY: GS

## 2024-02-19 SDOH — HEALTH STABILITY: PHYSICAL HEALTH: PHYSICAL EXERCISE: SEATED

## 2024-02-19 SDOH — HEALTH STABILITY: PHYSICAL HEALTH: EXERCISE ACTIVITY: QS

## 2024-02-19 ASSESSMENT — ENCOUNTER SYMPTOMS
PAIN LOCATION: RIGHT HIP
PAIN: 1
HIGHEST PAIN SEVERITY IN PAST 24 HOURS: 4/10
MUSCLE WEAKNESS: 1
PAIN LOCATION - PAIN SEVERITY: 2/10
LOWEST PAIN SEVERITY IN PAST 24 HOURS: 2/10
PERSON REPORTING PAIN: PATIENT

## 2024-02-19 ASSESSMENT — ACTIVITIES OF DAILY LIVING (ADL): AMBULATION_DISTANCE/DURATION_TOLERATED: 25 FEET

## 2024-02-20 ENCOUNTER — HOME CARE VISIT (OUTPATIENT)
Dept: HOME HEALTH SERVICES | Facility: HOME HEALTH | Age: 84
End: 2024-02-20
Payer: MEDICARE

## 2024-02-20 VITALS
OXYGEN SATURATION: 96 % | SYSTOLIC BLOOD PRESSURE: 128 MMHG | HEART RATE: 77 BPM | DIASTOLIC BLOOD PRESSURE: 72 MMHG | TEMPERATURE: 97.3 F

## 2024-02-20 PROCEDURE — 1090000001 HH PPS REVENUE CREDIT

## 2024-02-20 PROCEDURE — 1090000002 HH PPS REVENUE DEBIT

## 2024-02-20 PROCEDURE — G0152 HHCP-SERV OF OT,EA 15 MIN: HCPCS | Mod: HHH

## 2024-02-20 ASSESSMENT — ENCOUNTER SYMPTOMS
PAIN LOCATION - RELIEVING FACTORS: REST, TYLENOL
PAIN LOCATION - EXACERBATING FACTORS: PROLONGED INACTIVITY
PAIN LOCATION - PAIN QUALITY: ACHE
PAIN LOCATION - PAIN FREQUENCY: FREQUENT
PAIN LOCATION - PAIN SEVERITY: 2/10
PAIN: 1
PERSON REPORTING PAIN: PATIENT
PAIN LOCATION - PAIN DURATION: FREQUENT
LOWEST PAIN SEVERITY IN PAST 24 HOURS: 2/10
PAIN LOCATION: RIGHT HIP
HIGHEST PAIN SEVERITY IN PAST 24 HOURS: 4/10
SUBJECTIVE PAIN PROGRESSION: GRADUALLY IMPROVING

## 2024-02-21 PROCEDURE — 1090000002 HH PPS REVENUE DEBIT

## 2024-02-21 PROCEDURE — 1090000001 HH PPS REVENUE CREDIT

## 2024-02-22 ENCOUNTER — HOME CARE VISIT (OUTPATIENT)
Dept: HOME HEALTH SERVICES | Facility: HOME HEALTH | Age: 84
End: 2024-02-22
Payer: MEDICARE

## 2024-02-22 VITALS
SYSTOLIC BLOOD PRESSURE: 120 MMHG | HEART RATE: 78 BPM | TEMPERATURE: 97.2 F | OXYGEN SATURATION: 97 % | DIASTOLIC BLOOD PRESSURE: 72 MMHG

## 2024-02-22 PROCEDURE — G0152 HHCP-SERV OF OT,EA 15 MIN: HCPCS | Mod: HHH

## 2024-02-22 PROCEDURE — 1090000001 HH PPS REVENUE CREDIT

## 2024-02-22 PROCEDURE — 1090000002 HH PPS REVENUE DEBIT

## 2024-02-22 PROCEDURE — G0151 HHCP-SERV OF PT,EA 15 MIN: HCPCS | Mod: HHH

## 2024-02-22 SDOH — HEALTH STABILITY: PHYSICAL HEALTH: EXERCISE ACTIVITY: CALF RAISES

## 2024-02-22 SDOH — HEALTH STABILITY: PHYSICAL HEALTH: EXERCISE ACTIVITY: KNEE FLEXION

## 2024-02-22 SDOH — HEALTH STABILITY: PHYSICAL HEALTH: PHYSICAL EXERCISE: STANDING

## 2024-02-22 SDOH — HEALTH STABILITY: PHYSICAL HEALTH: PHYSICAL EXERCISE: 10

## 2024-02-22 SDOH — HEALTH STABILITY: PHYSICAL HEALTH: EXERCISE ACTIVITY: HIP ABD

## 2024-02-22 SDOH — HEALTH STABILITY: PHYSICAL HEALTH: EXERCISE ACTIVITY: HIP EXT

## 2024-02-22 SDOH — HEALTH STABILITY: PHYSICAL HEALTH: EXERCISE COMMENTS: INSTRUCTED PATIENT IN STANDING EXERCISES. TOLERATED WELL.

## 2024-02-22 SDOH — HEALTH STABILITY: PHYSICAL HEALTH: EXERCISE ACTIVITY: MINI SQUATS

## 2024-02-22 SDOH — HEALTH STABILITY: PHYSICAL HEALTH: EXERCISE ACTIVITY: HIP FLEXION

## 2024-02-22 ASSESSMENT — ENCOUNTER SYMPTOMS
HIGHEST PAIN SEVERITY IN PAST 24 HOURS: 8/10
SUBJECTIVE PAIN PROGRESSION: WAXING AND WANING
LOWEST PAIN SEVERITY IN PAST 24 HOURS: 2/10
PERSON REPORTING PAIN: PATIENT
PAIN LOCATION - PAIN FREQUENCY: INTERMITTENT
PAIN: 1
PAIN LOCATION: RIGHT HIP
PAIN LOCATION - PAIN QUALITY: ACHE
PAIN LOCATION: RIGHT HIP
LOWEST PAIN SEVERITY IN PAST 24 HOURS: 2/10
PAIN LOCATION - RELIEVING FACTORS: REST, TYLENOL
PAIN LOCATION - PAIN DURATION: INTERMITTENT
PAIN LOCATION - PAIN SEVERITY: 2/10
HIGHEST PAIN SEVERITY IN PAST 24 HOURS: 8/10
PAIN LOCATION - PAIN SEVERITY: 2/10
PERSON REPORTING PAIN: PATIENT
PAIN: 1

## 2024-02-22 ASSESSMENT — ACTIVITIES OF DAILY LIVING (ADL)
OASIS_M1830: 03
AMBULATION_DISTANCE/DURATION_TOLERATED: 75 FEET

## 2024-02-23 PROCEDURE — 1090000001 HH PPS REVENUE CREDIT

## 2024-02-23 PROCEDURE — 1090000002 HH PPS REVENUE DEBIT

## 2024-02-24 PROCEDURE — 1090000002 HH PPS REVENUE DEBIT

## 2024-02-24 PROCEDURE — 1090000001 HH PPS REVENUE CREDIT

## 2024-02-25 PROCEDURE — 1090000002 HH PPS REVENUE DEBIT

## 2024-02-25 PROCEDURE — 1090000001 HH PPS REVENUE CREDIT

## 2024-02-26 ENCOUNTER — HOME CARE VISIT (OUTPATIENT)
Dept: HOME HEALTH SERVICES | Facility: HOME HEALTH | Age: 84
End: 2024-02-26
Payer: MEDICARE

## 2024-02-26 VITALS
HEART RATE: 78 BPM | TEMPERATURE: 97.3 F | SYSTOLIC BLOOD PRESSURE: 132 MMHG | OXYGEN SATURATION: 98 % | DIASTOLIC BLOOD PRESSURE: 82 MMHG

## 2024-02-26 PROCEDURE — G0152 HHCP-SERV OF OT,EA 15 MIN: HCPCS | Mod: HHH

## 2024-02-26 PROCEDURE — 1090000002 HH PPS REVENUE DEBIT

## 2024-02-26 PROCEDURE — 1090000001 HH PPS REVENUE CREDIT

## 2024-02-26 PROCEDURE — G0151 HHCP-SERV OF PT,EA 15 MIN: HCPCS | Mod: HHH

## 2024-02-26 SDOH — HEALTH STABILITY: PHYSICAL HEALTH: EXERCISE ACTIVITY: KNEE FLEXION

## 2024-02-26 SDOH — HEALTH STABILITY: PHYSICAL HEALTH: PHYSICAL EXERCISE: STANDING

## 2024-02-26 SDOH — HEALTH STABILITY: PHYSICAL HEALTH: PHYSICAL EXERCISE: 15

## 2024-02-26 SDOH — HEALTH STABILITY: PHYSICAL HEALTH: EXERCISE ACTIVITY: HIP EXT

## 2024-02-26 SDOH — HEALTH STABILITY: PHYSICAL HEALTH: EXERCISE ACTIVITY: HIP FLEXION

## 2024-02-26 SDOH — HEALTH STABILITY: PHYSICAL HEALTH: EXERCISE ACTIVITY: MINI SQUATS

## 2024-02-26 SDOH — HEALTH STABILITY: PHYSICAL HEALTH: EXERCISE ACTIVITY: HIP ABD

## 2024-02-26 SDOH — HEALTH STABILITY: PHYSICAL HEALTH: EXERCISE ACTIVITY: CALF RAISES

## 2024-02-26 ASSESSMENT — ENCOUNTER SYMPTOMS
PAIN LOCATION - PAIN SEVERITY: 1/10
PAIN: 1
HIGHEST PAIN SEVERITY IN PAST 24 HOURS: 4/10
PAIN LOCATION - PAIN SEVERITY: 2/10
SUBJECTIVE PAIN PROGRESSION: RAPIDLY IMPROVING
PAIN LOCATION - PAIN DURATION: INFREQUENT
LOWEST PAIN SEVERITY IN PAST 24 HOURS: 0/10
PAIN LOCATION: RIGHT HIP
PAIN LOCATION: RIGHT HIP
PERSON REPORTING PAIN: PATIENT
PAIN: 1
PAIN LOCATION - PAIN FREQUENCY: INFREQUENT
HIGHEST PAIN SEVERITY IN PAST 24 HOURS: 4/10
PERSON REPORTING PAIN: PATIENT
LOWEST PAIN SEVERITY IN PAST 24 HOURS: 1/10

## 2024-02-26 ASSESSMENT — ACTIVITIES OF DAILY LIVING (ADL): AMBULATION_DISTANCE/DURATION_TOLERATED: 200 FEET

## 2024-02-27 PROCEDURE — 1090000001 HH PPS REVENUE CREDIT

## 2024-02-27 PROCEDURE — 1090000002 HH PPS REVENUE DEBIT

## 2024-02-28 PROCEDURE — 1090000001 HH PPS REVENUE CREDIT

## 2024-02-28 PROCEDURE — 1090000002 HH PPS REVENUE DEBIT

## 2024-02-29 ENCOUNTER — OFFICE VISIT (OUTPATIENT)
Dept: ORTHOPEDIC SURGERY | Facility: CLINIC | Age: 84
End: 2024-02-29
Payer: MEDICARE

## 2024-02-29 ENCOUNTER — HOSPITAL ENCOUNTER (OUTPATIENT)
Dept: RADIOLOGY | Facility: CLINIC | Age: 84
Discharge: HOME | End: 2024-02-29
Payer: MEDICARE

## 2024-02-29 VITALS — WEIGHT: 113 LBS | HEIGHT: 62 IN | BODY MASS INDEX: 20.8 KG/M2

## 2024-02-29 DIAGNOSIS — M25.551 RIGHT HIP PAIN: ICD-10-CM

## 2024-02-29 PROCEDURE — 73502 X-RAY EXAM HIP UNI 2-3 VIEWS: CPT | Mod: RIGHT SIDE | Performed by: RADIOLOGY

## 2024-02-29 PROCEDURE — 73502 X-RAY EXAM HIP UNI 2-3 VIEWS: CPT | Mod: RT

## 2024-02-29 PROCEDURE — 99024 POSTOP FOLLOW-UP VISIT: CPT | Performed by: ORTHOPAEDIC SURGERY

## 2024-02-29 PROCEDURE — 1036F TOBACCO NON-USER: CPT | Performed by: ORTHOPAEDIC SURGERY

## 2024-02-29 PROCEDURE — 1159F MED LIST DOCD IN RCRD: CPT | Performed by: ORTHOPAEDIC SURGERY

## 2024-02-29 PROCEDURE — 1090000001 HH PPS REVENUE CREDIT

## 2024-02-29 PROCEDURE — 1090000002 HH PPS REVENUE DEBIT

## 2024-02-29 PROCEDURE — 1125F AMNT PAIN NOTED PAIN PRSNT: CPT | Performed by: ORTHOPAEDIC SURGERY

## 2024-02-29 PROCEDURE — 1111F DSCHRG MED/CURRENT MED MERGE: CPT | Performed by: ORTHOPAEDIC SURGERY

## 2024-02-29 ASSESSMENT — PAIN SCALES - GENERAL: PAINLEVEL_OUTOF10: 2

## 2024-02-29 ASSESSMENT — PAIN DESCRIPTION - DESCRIPTORS: DESCRIPTORS: ACHING

## 2024-02-29 ASSESSMENT — PAIN - FUNCTIONAL ASSESSMENT: PAIN_FUNCTIONAL_ASSESSMENT: 0-10

## 2024-02-29 NOTE — PROGRESS NOTES
2 weeks status post right total hip arthroplasty  Doing very well walking without any ambulatory aid she has been discharged by physical therapy  Examination: Incision healing nicely no leg length discrepancy fairly normal gait  X-rays show implant in good position assessment doing well reviewed precautions expectations  Follow-up annually

## 2024-03-01 ENCOUNTER — HOME CARE VISIT (OUTPATIENT)
Dept: HOME HEALTH SERVICES | Facility: HOME HEALTH | Age: 84
End: 2024-03-01
Payer: MEDICARE

## 2024-03-01 VITALS
SYSTOLIC BLOOD PRESSURE: 140 MMHG | DIASTOLIC BLOOD PRESSURE: 82 MMHG | TEMPERATURE: 97.2 F | HEART RATE: 73 BPM | OXYGEN SATURATION: 98 %

## 2024-03-01 PROCEDURE — 1090000002 HH PPS REVENUE DEBIT

## 2024-03-01 PROCEDURE — 1090000001 HH PPS REVENUE CREDIT

## 2024-03-01 PROCEDURE — G0152 HHCP-SERV OF OT,EA 15 MIN: HCPCS | Mod: HHH

## 2024-03-01 ASSESSMENT — ENCOUNTER SYMPTOMS
PAIN LOCATION - PAIN SEVERITY: 1/10
PAIN LOCATION: RIGHT HIP
SUBJECTIVE PAIN PROGRESSION: GRADUALLY IMPROVING
PAIN LOCATION - RELIEVING FACTORS: TYLENOL
LOWEST PAIN SEVERITY IN PAST 24 HOURS: 0/10
PAIN LOCATION - PAIN QUALITY: ACHE
PAIN LOCATION - PAIN DURATION: INTERMITTENT
PAIN LOCATION - EXACERBATING FACTORS: INACTIVITY
PAIN: 1
PERSON REPORTING PAIN: PATIENT
PAIN LOCATION - PAIN FREQUENCY: INTERMITTENT
HIGHEST PAIN SEVERITY IN PAST 24 HOURS: 2/10

## 2024-03-01 ASSESSMENT — ACTIVITIES OF DAILY LIVING (ADL)
HOME_HEALTH_OASIS: 00
OASIS_M1830: 00

## 2024-03-05 ENCOUNTER — OFFICE VISIT (OUTPATIENT)
Dept: PRIMARY CARE | Facility: CLINIC | Age: 84
End: 2024-03-05
Payer: MEDICARE

## 2024-03-05 VITALS
WEIGHT: 112.6 LBS | BODY MASS INDEX: 20.72 KG/M2 | HEIGHT: 62 IN | TEMPERATURE: 95.3 F | RESPIRATION RATE: 14 BRPM | HEART RATE: 64 BPM | SYSTOLIC BLOOD PRESSURE: 142 MMHG | DIASTOLIC BLOOD PRESSURE: 60 MMHG | OXYGEN SATURATION: 98 %

## 2024-03-05 DIAGNOSIS — Z09 HOSPITAL DISCHARGE FOLLOW-UP: Primary | ICD-10-CM

## 2024-03-05 DIAGNOSIS — I95.1 ORTHOSTATIC HYPOTENSION: ICD-10-CM

## 2024-03-05 PROBLEM — I77.3 FIBROMUSCULAR DYSPLASIA (CMS-HCC): Status: RESOLVED | Noted: 2023-05-25 | Resolved: 2024-03-05

## 2024-03-05 PROCEDURE — 1160F RVW MEDS BY RX/DR IN RCRD: CPT | Performed by: STUDENT IN AN ORGANIZED HEALTH CARE EDUCATION/TRAINING PROGRAM

## 2024-03-05 PROCEDURE — 1125F AMNT PAIN NOTED PAIN PRSNT: CPT | Performed by: STUDENT IN AN ORGANIZED HEALTH CARE EDUCATION/TRAINING PROGRAM

## 2024-03-05 PROCEDURE — 1159F MED LIST DOCD IN RCRD: CPT | Performed by: STUDENT IN AN ORGANIZED HEALTH CARE EDUCATION/TRAINING PROGRAM

## 2024-03-05 PROCEDURE — 1111F DSCHRG MED/CURRENT MED MERGE: CPT | Performed by: STUDENT IN AN ORGANIZED HEALTH CARE EDUCATION/TRAINING PROGRAM

## 2024-03-05 PROCEDURE — 99213 OFFICE O/P EST LOW 20 MIN: CPT | Performed by: STUDENT IN AN ORGANIZED HEALTH CARE EDUCATION/TRAINING PROGRAM

## 2024-03-05 PROCEDURE — 1036F TOBACCO NON-USER: CPT | Performed by: STUDENT IN AN ORGANIZED HEALTH CARE EDUCATION/TRAINING PROGRAM

## 2024-03-05 ASSESSMENT — PATIENT HEALTH QUESTIONNAIRE - PHQ9
2. FEELING DOWN, DEPRESSED OR HOPELESS: NOT AT ALL
1. LITTLE INTEREST OR PLEASURE IN DOING THINGS: NOT AT ALL
SUM OF ALL RESPONSES TO PHQ9 QUESTIONS 1 AND 2: 0

## 2024-03-05 NOTE — PROGRESS NOTES
Subjective   Patient ID: Mera Ansari is a pleasant 83 y.o. female who presents for Follow-up (Follow up- hospital discharge).  HPI  Patient is here for follow-up after hospital discharge.  She was admitted to the hospital after she was noted to have orthostatic hypotension.  She was started on Florinef which resolved orthostatic hypotension.  Her blood pressure is slightly elevated today.  She has stopped taking the Florinef now.  She has completed therapy after hip replacement surgery.  Reports that overall doing well.  She continues to check her blood pressure regularly at home.  She also states that he had received a letter from her insurance company stating that Synthroid is no longer covered with her medication coverage.  I advised the patient to check with her pharmacy to determine which other brands would be covered under her insurance.    Review of Systems   All other systems reviewed and are negative.      Visit Vitals  /60 (Patient Position: Sitting)   Pulse 64   Temp 35.2 °C (95.3 °F)   Resp 14          Objective   Physical Exam  Constitutional:       General: She is not in acute distress.     Appearance: Normal appearance.   HENT:      Head: Normocephalic and atraumatic.   Eyes:      General: No scleral icterus.     Conjunctiva/sclera: Conjunctivae normal.   Cardiovascular:      Rate and Rhythm: Normal rate and regular rhythm.      Heart sounds: Normal heart sounds.   Pulmonary:      Effort: Pulmonary effort is normal.      Breath sounds: Normal breath sounds. No wheezing.   Abdominal:      General: Bowel sounds are normal. There is no distension.      Palpations: Abdomen is soft.      Tenderness: There is no abdominal tenderness.   Musculoskeletal:      Cervical back: Neck supple.      Right lower leg: No edema.      Left lower leg: No edema.   Lymphadenopathy:      Cervical: No cervical adenopathy.   Skin:     General: Skin is warm and dry.   Neurological:      General: No focal deficit present.       Mental Status: She is alert and oriented to person, place, and time.   Psychiatric:         Mood and Affect: Mood normal.         Behavior: Behavior normal.         Assessment/Plan   Problem List Items Addressed This Visit    None  Visit Diagnoses       Hospital discharge follow-up    -  Primary    Orthostatic hypotension      Resolved, has stopped taking Florinef  Advised to continue to monitor blood pressure at home

## 2024-03-08 DIAGNOSIS — E03.9 HYPOTHYROIDISM, UNSPECIFIED TYPE: ICD-10-CM

## 2024-03-08 RX ORDER — LEVOTHYROXINE SODIUM 88 UG/1
88 TABLET ORAL DAILY
Qty: 90 TABLET | Refills: 1 | Status: SHIPPED | OUTPATIENT
Start: 2024-03-08

## 2024-03-31 ENCOUNTER — APPOINTMENT (OUTPATIENT)
Dept: DERMATOLOGY | Facility: HOSPITAL | Age: 84
End: 2024-03-31
Payer: MEDICARE

## 2024-04-01 DIAGNOSIS — E78.5 HYPERLIPIDEMIA, UNSPECIFIED HYPERLIPIDEMIA TYPE: ICD-10-CM

## 2024-04-01 RX ORDER — ATORVASTATIN CALCIUM 10 MG/1
10 TABLET, FILM COATED ORAL DAILY
Qty: 90 TABLET | Refills: 3 | Status: SHIPPED | OUTPATIENT
Start: 2024-04-01

## 2024-06-04 DIAGNOSIS — E55.9 VITAMIN D DEFICIENCY: ICD-10-CM

## 2024-06-04 DIAGNOSIS — D64.9 ANEMIA, UNSPECIFIED TYPE: ICD-10-CM

## 2024-06-04 DIAGNOSIS — Z00.00 ANNUAL PHYSICAL EXAM: Primary | ICD-10-CM

## 2024-06-06 ENCOUNTER — APPOINTMENT (OUTPATIENT)
Dept: PRIMARY CARE | Facility: CLINIC | Age: 84
End: 2024-06-06
Payer: MEDICARE

## 2024-06-11 ENCOUNTER — LAB (OUTPATIENT)
Dept: LAB | Facility: LAB | Age: 84
End: 2024-06-11
Payer: MEDICARE

## 2024-06-11 DIAGNOSIS — D64.9 ANEMIA, UNSPECIFIED TYPE: ICD-10-CM

## 2024-06-11 DIAGNOSIS — E55.9 VITAMIN D DEFICIENCY: ICD-10-CM

## 2024-06-11 DIAGNOSIS — Z00.00 ANNUAL PHYSICAL EXAM: ICD-10-CM

## 2024-06-11 DIAGNOSIS — E03.9 HYPOTHYROIDISM, ADULT: ICD-10-CM

## 2024-06-11 LAB
25(OH)D3 SERPL-MCNC: 57 NG/ML (ref 30–100)
ALBUMIN SERPL BCP-MCNC: 4.2 G/DL (ref 3.4–5)
ALP SERPL-CCNC: 51 U/L (ref 33–136)
ALT SERPL W P-5'-P-CCNC: 16 U/L (ref 7–45)
ANION GAP SERPL CALC-SCNC: 12 MMOL/L (ref 10–20)
AST SERPL W P-5'-P-CCNC: 23 U/L (ref 9–39)
BILIRUB SERPL-MCNC: 0.7 MG/DL (ref 0–1.2)
BUN SERPL-MCNC: 12 MG/DL (ref 6–23)
CALCIUM SERPL-MCNC: 9.7 MG/DL (ref 8.6–10.3)
CHLORIDE SERPL-SCNC: 102 MMOL/L (ref 98–107)
CHOLEST SERPL-MCNC: 185 MG/DL (ref 0–199)
CHOLESTEROL/HDL RATIO: 2.4
CO2 SERPL-SCNC: 29 MMOL/L (ref 21–32)
CREAT SERPL-MCNC: 0.7 MG/DL (ref 0.5–1.05)
EGFRCR SERPLBLD CKD-EPI 2021: 86 ML/MIN/1.73M*2
ERYTHROCYTE [DISTWIDTH] IN BLOOD BY AUTOMATED COUNT: 13 % (ref 11.5–14.5)
EST. AVERAGE GLUCOSE BLD GHB EST-MCNC: 117 MG/DL
GLUCOSE SERPL-MCNC: 109 MG/DL (ref 74–99)
HBA1C MFR BLD: 5.7 %
HCT VFR BLD AUTO: 42.8 % (ref 36–46)
HDLC SERPL-MCNC: 77.4 MG/DL
HGB BLD-MCNC: 14 G/DL (ref 12–16)
LDLC SERPL CALC-MCNC: 97 MG/DL
MCH RBC QN AUTO: 30.4 PG (ref 26–34)
MCHC RBC AUTO-ENTMCNC: 32.7 G/DL (ref 32–36)
MCV RBC AUTO: 93 FL (ref 80–100)
NON HDL CHOLESTEROL: 108 MG/DL (ref 0–149)
NRBC BLD-RTO: 0 /100 WBCS (ref 0–0)
PLATELET # BLD AUTO: 331 X10*3/UL (ref 150–450)
POTASSIUM SERPL-SCNC: 5 MMOL/L (ref 3.5–5.3)
PROT SERPL-MCNC: 6.7 G/DL (ref 6.4–8.2)
RBC # BLD AUTO: 4.61 X10*6/UL (ref 4–5.2)
SODIUM SERPL-SCNC: 138 MMOL/L (ref 136–145)
TRIGL SERPL-MCNC: 54 MG/DL (ref 0–149)
VLDL: 11 MG/DL (ref 0–40)
WBC # BLD AUTO: 4.9 X10*3/UL (ref 4.4–11.3)

## 2024-06-11 PROCEDURE — 80061 LIPID PANEL: CPT

## 2024-06-11 PROCEDURE — 85027 COMPLETE CBC AUTOMATED: CPT

## 2024-06-11 PROCEDURE — 82306 VITAMIN D 25 HYDROXY: CPT

## 2024-06-11 PROCEDURE — 84443 ASSAY THYROID STIM HORMONE: CPT

## 2024-06-11 PROCEDURE — 36415 COLL VENOUS BLD VENIPUNCTURE: CPT

## 2024-06-11 PROCEDURE — 80053 COMPREHEN METABOLIC PANEL: CPT

## 2024-06-13 ENCOUNTER — APPOINTMENT (OUTPATIENT)
Dept: PRIMARY CARE | Facility: CLINIC | Age: 84
End: 2024-06-13
Payer: MEDICARE

## 2024-06-13 VITALS
OXYGEN SATURATION: 99 % | WEIGHT: 111 LBS | HEART RATE: 71 BPM | BODY MASS INDEX: 20.3 KG/M2 | DIASTOLIC BLOOD PRESSURE: 72 MMHG | SYSTOLIC BLOOD PRESSURE: 138 MMHG

## 2024-06-13 DIAGNOSIS — E03.9 HYPOTHYROIDISM, ADULT: Primary | ICD-10-CM

## 2024-06-13 LAB — TSH SERPL-ACNC: 2.03 MIU/L (ref 0.44–3.98)

## 2024-06-13 PROCEDURE — 1036F TOBACCO NON-USER: CPT | Performed by: STUDENT IN AN ORGANIZED HEALTH CARE EDUCATION/TRAINING PROGRAM

## 2024-06-13 PROCEDURE — 99203 OFFICE O/P NEW LOW 30 MIN: CPT | Performed by: STUDENT IN AN ORGANIZED HEALTH CARE EDUCATION/TRAINING PROGRAM

## 2024-06-13 PROCEDURE — 1160F RVW MEDS BY RX/DR IN RCRD: CPT | Performed by: STUDENT IN AN ORGANIZED HEALTH CARE EDUCATION/TRAINING PROGRAM

## 2024-06-13 PROCEDURE — 1159F MED LIST DOCD IN RCRD: CPT | Performed by: STUDENT IN AN ORGANIZED HEALTH CARE EDUCATION/TRAINING PROGRAM

## 2024-06-13 NOTE — PATIENT INSTRUCTIONS
Thank you for coming today Morena!    I will send you a message once the TSH comes through for your thyroid.    It was wonderful meeting you! We can follow up yearly and as needed.

## 2024-06-13 NOTE — PROGRESS NOTES
"Subjective   Patient ID: Morena Ansari is a 83 y.o. female who presents for Establish Care.    HPI  Concerns today:  Hospitalized this year for orthostatic hypotension-- was on florinef and was able to stop this     Chronic issues:  #Hypothyroidism  -Levothyroxine 88    #Hip replacement  -Surgery in Feb 2024  -Had syncopal episode when she was stood up for PT     #Vasovagal sycope  -Last 5 years  -Sometimes happens when she stops hiking  -Stays hydrated and electrolytes  -No longer having orthostatic issues      Social history:  - for [employer] (worked there 17 years)  -Was a teacher as well  -Play tennis, hiking   - to [place] for 61 years  -3 sons (1 in [place], 1 in [place], 1 in [place]) and 7 grandchildren   -Never smoked    Family history:  -Mother with autoimmune dz and stroke  -Brother with MI  -Father with prostate cancer    Objective   Visit Vitals  /72   Pulse 71   Wt 50.3 kg (111 lb)   SpO2 99%   BMI 20.30 kg/m²   OB Status Postmenopausal   Smoking Status Never   BSA 1.48 m²      Physical Exam  General: Well appearing, conversational, in no acute distress  HEENT: EOMI, PERRL, nares patent without congestion, MMM  CV: RRR, no murmurs  Resp: Lungs CTAB, normal work of breathing  GI: Soft, nondistended, nontender, BS+   Ext: No lower ext swelling  Skin: Warm, dry, no rashes  Neuro: Awake, alert, oriented x3, moving all 4 extremities, nonfocal, normal gait, ambulates without assistance  Psych: Appropriate mood and affect      Assessment/Plan   Mera Ansari \"Amor" is a 83 y.o. female with hypothyroidism and vasovagal syncope who presents for Establish Care.    Doing well overall  Will get TSH to make sure she is doing well on current 88mcg of levothyroxine    Follow up for Medicare Wellness in the fall    Problem List Items Addressed This Visit       Hypothyroidism, adult - Primary    Relevant Orders    TSH with reflex to Free T4 if abnormal (Completed)        "     Divina Lunsford MD MPH

## 2024-08-15 DIAGNOSIS — E03.9 HYPOTHYROIDISM, UNSPECIFIED TYPE: ICD-10-CM

## 2024-08-18 RX ORDER — LEVOTHYROXINE SODIUM 88 UG/1
88 TABLET ORAL DAILY
Qty: 90 TABLET | Refills: 1 | Status: SHIPPED | OUTPATIENT
Start: 2024-08-18

## 2024-09-16 ENCOUNTER — OFFICE VISIT (OUTPATIENT)
Dept: DERMATOLOGY | Facility: HOSPITAL | Age: 84
End: 2024-09-16
Payer: MEDICARE

## 2024-09-16 DIAGNOSIS — R21 RASH AND OTHER NONSPECIFIC SKIN ERUPTION: Primary | ICD-10-CM

## 2024-09-16 DIAGNOSIS — Z12.83 SCREENING EXAM FOR SKIN CANCER: ICD-10-CM
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
## 2024-09-16 PROCEDURE — 99214 OFFICE O/P EST MOD 30 MIN: CPT | Performed by: DERMATOLOGY

## 2024-09-16 PROCEDURE — 1160F RVW MEDS BY RX/DR IN RCRD: CPT | Performed by: DERMATOLOGY

## 2024-09-16 PROCEDURE — 11105 PUNCH BX SKIN EA SEP/ADDL: CPT | Performed by: DERMATOLOGY

## 2024-09-16 PROCEDURE — 11104 PUNCH BX SKIN SINGLE LESION: CPT | Performed by: DERMATOLOGY

## 2024-09-16 PROCEDURE — 1159F MED LIST DOCD IN RCRD: CPT | Performed by: DERMATOLOGY

## 2024-09-16 PROCEDURE — 99214 OFFICE O/P EST MOD 30 MIN: CPT | Mod: GC | Performed by: DERMATOLOGY

## 2024-09-16 RX ORDER — DOXYCYCLINE 100 MG/1
100 CAPSULE ORAL 2 TIMES DAILY
Qty: 20 CAPSULE | Refills: 0 | Status: SHIPPED | OUTPATIENT
Start: 2024-09-16 | End: 2024-09-26

## 2024-09-16 ASSESSMENT — DERMATOLOGY PATIENT ASSESSMENT
DO YOU HAVE IRREGULAR MENSTRUAL CYCLES: NO
ARE YOU AN ORGAN TRANSPLANT RECIPIENT: NO
ARE YOU ON BIRTH CONTROL: NO
DO YOU HAVE ANY NEW OR CHANGING LESIONS: NO
DO YOU USE A TANNING BED: NO
HAVE YOU HAD OR DO YOU HAVE A STAPH INFECTION: NO
DO YOU USE SUNSCREEN: OCCASIONALLY
ARE YOU TRYING TO GET PREGNANT: NO
HAVE YOU HAD OR DO YOU HAVE VASCULAR DISEASE: NO

## 2024-09-16 ASSESSMENT — DERMATOLOGY QUALITY OF LIFE (QOL) ASSESSMENT
RATE HOW BOTHERED YOU ARE BY SYMPTOMS OF YOUR SKIN PROBLEM (EG, ITCHING, STINGING BURNING, HURTING OR SKIN IRRITATION): 0 - NEVER BOTHERED
DATE THE QUALITY-OF-LIFE ASSESSMENT WAS COMPLETED: 67099
RATE HOW BOTHERED YOU ARE BY EFFECTS OF YOUR SKIN PROBLEMS ON YOUR ACTIVITIES (EG, GOING OUT, ACCOMPLISHING WHAT YOU WANT, WORK ACTIVITIES OR YOUR RELATIONSHIPS WITH OTHERS): 0 - NEVER BOTHERED
RATE HOW EMOTIONALLY BOTHERED YOU ARE BY YOUR SKIN PROBLEM (FOR EXAMPLE, WORRY, EMBARRASSMENT, FRUSTRATION): 0 - NEVER BOTHERED
ARE THERE EXCLUSIONS OR EXCEPTIONS FOR THE QUALITY OF LIFE ASSESSMENT: NO

## 2024-09-16 ASSESSMENT — PATIENT GLOBAL ASSESSMENT (PGA): PATIENT GLOBAL ASSESSMENT: PATIENT GLOBAL ASSESSMENT:  1 - CLEAR

## 2024-09-16 ASSESSMENT — ITCH NUMERIC RATING SCALE: HOW SEVERE IS YOUR ITCHING?: 4

## 2024-09-16 NOTE — PROGRESS NOTES
Subjective     Mera Ansari is a 84 y.o. female who presents for the following: Rash.     Review of Systems:  No other skin or systemic complaints other than what is documented elsewhere in the note.    The following portions of the chart were reviewed this encounter and updated as appropriate:  Tobacco  Allergies  Meds  Problems  Med Hx  Surg Hx         Skin Cancer History  No skin cancer on file.      Specialty Problems          Dermatology Problems    Chronic discoid lupus erythematosus    Primary vitiligo    Contusion of knee, left    Contusion of knee, right    Seborrhea capitis        Objective   Well appearing patient in no apparent distress; mood and affect are within normal limits.    A full examination was performed including scalp, head, eyes, ears, nose, lips, neck, chest, axillae, abdomen, back, buttocks, bilateral upper extremities, bilateral lower extremities, hands, feet, fingers, toes, fingernails, and toenails. All findings within normal limits unless otherwise noted below.    Assessment/Plan   1. Rash and other nonspecific skin eruption  right lower abdomen  Well demarcated annular macules without scale present in the right axilla and bilateral lower abdomen                  Ddx includes subacute cutaneous lupus erythematosus vs granuloma annulare vs erythema marginatum vs erythema annulare centrifugum vs CTCL  - Per pt report these lesions started to appear 1 week ago, and are pruritic. Went to urgent care and was prescribed hydrocortisone, which she has been applying 3x/day to pruritic lesions.   - Discussed with patient that differential is broad and suspect that it may be a different process than the papules scattered on her lower extremities  - Pt is also an avid hiker. She has not found any ticks. We discussed the possibility of erythema marginatum as a diagnosis and would favor empirically treating for Lyme disease. Pt in agreement.  - START doxycycline 100 mg bid x 10 days. Advised  to take with food. Discussed side effects including photosensitvity. Rx sent.   - Apply triamcinolone 0.1% ointment bid to areas of rash. Pt has prescription at home. No refill required    Lesion biopsy - right lower abdomen  Type of biopsy: punch    Informed consent: discussed and consent obtained    Timeout: patient name, date of birth, surgical site, and procedure verified    Procedure prep:  Patient was prepped and draped  Anesthesia: the lesion was anesthetized in a standard fashion    Anesthetic:  1% lidocaine w/ epinephrine 1-100,000 local infiltration  Punch size:  4 mm  Suture size:  5-0  Suture type: fast-absorbing plain gut    Suture removal (days):  14  Hemostasis achieved with: suture    Outcome: patient tolerated procedure well    Post-procedure details: sterile dressing applied and wound care instructions given    Dressing type: bandage and petrolatum      doxycycline (Monodox) 100 mg capsule - right lower abdomen  Take 1 capsule (100 mg) by mouth 2 times a day for 10 days. Take with at least 8 ounces (large glass) of water, do not lie down for 30 minutes after    Specimen 1 - Dermatopathology- DERM LAB  Differential Diagnosis: SCLE vs Lyme disease  Check Margins Yes/No?:  No  Comments:    Dermpath Lab: Routine Histopathology (formalin-fixed tissue)    Related Procedures  Follow Up In Dermatology - Established Patient    2. Bitten or stung by nonvenomous insect and other nonvenomous arthropods, initial encounter (3)  Left Lower Leg - Anterior, Left lower back, Right Lower Leg - Anterior  Erythematous, edematous papule(s) with central punctum              Ddx arthropod reaction vs CTCL  -Discussed nature of condition  - Pt denies any pets or close contact with pets  - Advised that she should check for Bedbugs. Keep the room dark for several hours, then look at the mattress seams with a flashlight  - Discussed differential with patient and that biopsy is required to further rule out differential.  Patient in agreement.       Lesion biopsy - Left lower back  Type of biopsy: punch    Informed consent: discussed and consent obtained    Timeout: patient name, date of birth, surgical site, and procedure verified    Procedure prep:  Patient was prepped and draped  Anesthesia: the lesion was anesthetized in a standard fashion    Anesthetic:  1% lidocaine w/ epinephrine 1-100,000 local infiltration  Punch size:  4 mm  Suture size:  5-0  Suture type: fast-absorbing plain gut    Hemostasis achieved with: suture    Outcome: patient tolerated procedure well    Post-procedure details: wound care instructions given      Specimen 2 - Dermatopathology- DERM LAB  Differential Diagnosis: arthropod reaction  Check Margins Yes/No?:  No  Comments:    Dermpath Lab: Routine Histopathology (formalin-fixed tissue)      Patient seen and discussed with Dr. Somers,   Mariah Tapia MD MPH  PGY-2, Department of Dermatology     I was present for the entirety of the procedure(s).  I saw and evaluated the patient. I personally obtained the key and critical portions of the history and physical exam or was physically present for key and critical portions performed by the resident/fellow. I reviewed the resident/fellow's documentation and discussed the patient with the resident/fellow. I agree with the resident/fellow's medical decision making as documented in the note and made changes where appropriate.    Ally Somers MD

## 2024-09-26 LAB
LABORATORY COMMENT REPORT: NORMAL
PATH REPORT.FINAL DX SPEC: NORMAL
PATH REPORT.GROSS SPEC: NORMAL
PATH REPORT.RELEVANT HX SPEC: NORMAL
PATH REPORT.TOTAL CANCER: NORMAL

## 2024-10-03 DIAGNOSIS — R21 RASH AND OTHER NONSPECIFIC SKIN ERUPTION: Primary | ICD-10-CM

## 2024-10-03 RX ORDER — TRIAMCINOLONE ACETONIDE 1 MG/G
CREAM TOPICAL 2 TIMES DAILY
Qty: 80 G | Refills: 3 | Status: SHIPPED | OUTPATIENT
Start: 2024-10-03

## 2025-01-18 DIAGNOSIS — E03.9 HYPOTHYROIDISM, UNSPECIFIED TYPE: ICD-10-CM

## 2025-01-20 ENCOUNTER — APPOINTMENT (OUTPATIENT)
Dept: DERMATOLOGY | Facility: HOSPITAL | Age: 85
End: 2025-01-20
Payer: MEDICARE

## 2025-01-21 RX ORDER — LEVOTHYROXINE SODIUM 88 UG/1
88 TABLET ORAL DAILY
Qty: 90 TABLET | Refills: 1 | Status: SHIPPED | OUTPATIENT
Start: 2025-01-21

## 2025-02-17 ENCOUNTER — APPOINTMENT (OUTPATIENT)
Dept: DERMATOLOGY | Facility: HOSPITAL | Age: 85
End: 2025-02-17
Payer: MEDICARE

## 2025-02-23 DIAGNOSIS — E78.5 HYPERLIPIDEMIA, UNSPECIFIED HYPERLIPIDEMIA TYPE: ICD-10-CM

## 2025-02-25 RX ORDER — ATORVASTATIN CALCIUM 10 MG/1
10 TABLET, FILM COATED ORAL DAILY
Qty: 90 TABLET | Refills: 3 | Status: SHIPPED | OUTPATIENT
Start: 2025-02-25

## 2025-03-31 ENCOUNTER — APPOINTMENT (OUTPATIENT)
Dept: DERMATOLOGY | Facility: HOSPITAL | Age: 85
End: 2025-03-31
Payer: MEDICARE

## 2025-06-14 DIAGNOSIS — E03.9 HYPOTHYROIDISM, UNSPECIFIED TYPE: ICD-10-CM

## 2025-06-17 RX ORDER — LEVOTHYROXINE SODIUM 88 UG/1
88 TABLET ORAL DAILY
Qty: 90 TABLET | Refills: 0 | Status: SHIPPED | OUTPATIENT
Start: 2025-06-17

## 2025-06-24 ENCOUNTER — APPOINTMENT (OUTPATIENT)
Dept: PRIMARY CARE | Facility: CLINIC | Age: 85
End: 2025-06-24
Payer: MEDICARE

## 2025-07-21 ENCOUNTER — APPOINTMENT (OUTPATIENT)
Dept: PRIMARY CARE | Facility: CLINIC | Age: 85
End: 2025-07-21
Payer: MEDICARE

## 2025-07-21 VITALS
OXYGEN SATURATION: 95 % | WEIGHT: 110.4 LBS | BODY MASS INDEX: 20.32 KG/M2 | SYSTOLIC BLOOD PRESSURE: 149 MMHG | HEART RATE: 72 BPM | DIASTOLIC BLOOD PRESSURE: 80 MMHG | HEIGHT: 62 IN

## 2025-07-21 DIAGNOSIS — L80 PRIMARY VITILIGO: ICD-10-CM

## 2025-07-21 DIAGNOSIS — E78.5 HYPERLIPIDEMIA, UNSPECIFIED HYPERLIPIDEMIA TYPE: ICD-10-CM

## 2025-07-21 DIAGNOSIS — I25.10 ARTERIOSCLEROSIS OF CORONARY ARTERY: ICD-10-CM

## 2025-07-21 DIAGNOSIS — Z00.00 ROUTINE GENERAL MEDICAL EXAMINATION AT A HEALTH CARE FACILITY: Primary | ICD-10-CM

## 2025-07-21 DIAGNOSIS — I49.1 PAC (PREMATURE ATRIAL CONTRACTION): ICD-10-CM

## 2025-07-21 DIAGNOSIS — M81.0 OSTEOPOROSIS WITHOUT CURRENT PATHOLOGICAL FRACTURE, UNSPECIFIED OSTEOPOROSIS TYPE: ICD-10-CM

## 2025-07-21 DIAGNOSIS — Z00.00 ROUTINE GENERAL MEDICAL EXAMINATION AT HEALTH CARE FACILITY: ICD-10-CM

## 2025-07-21 DIAGNOSIS — Z78.0 POSTMENOPAUSAL ESTROGEN DEFICIENCY: ICD-10-CM

## 2025-07-21 DIAGNOSIS — E03.9 HYPOTHYROIDISM, ADULT: ICD-10-CM

## 2025-07-21 DIAGNOSIS — F43.20 GRIEF REACTION: ICD-10-CM

## 2025-07-21 DIAGNOSIS — R79.9 ABNORMAL FINDING OF BLOOD CHEMISTRY, UNSPECIFIED: ICD-10-CM

## 2025-07-21 DIAGNOSIS — I47.10 SUPRAVENTRICULAR TACHYCARDIA: ICD-10-CM

## 2025-07-21 DIAGNOSIS — Z12.31 ENCOUNTER FOR SCREENING MAMMOGRAM FOR MALIGNANT NEOPLASM OF BREAST: ICD-10-CM

## 2025-07-21 PROBLEM — D22.5 MELANOCYTIC NEVI OF TRUNK: Status: ACTIVE | Noted: 2019-04-30

## 2025-07-21 PROBLEM — D22.39 MELANOCYTIC NEVI OF OTHER PARTS OF FACE: Status: ACTIVE | Noted: 2022-03-18

## 2025-07-21 PROBLEM — D18.01 HEMANGIOMA OF SKIN AND SUBCUTANEOUS TISSUE: Status: RESOLVED | Noted: 2022-03-18 | Resolved: 2025-07-21

## 2025-07-21 PROBLEM — S80.01XA TRAUMATIC HEMATOMA OF RIGHT KNEE: Status: RESOLVED | Noted: 2023-06-05 | Resolved: 2025-07-21

## 2025-07-21 PROBLEM — B02.9 HERPES ZOSTER WITHOUT COMPLICATION: Status: RESOLVED | Noted: 2018-05-03 | Resolved: 2025-07-21

## 2025-07-21 PROBLEM — J06.9 URI WITH COUGH AND CONGESTION: Status: RESOLVED | Noted: 2023-06-05 | Resolved: 2025-07-21

## 2025-07-21 PROBLEM — S80.01XA CONTUSION OF KNEE, RIGHT: Status: RESOLVED | Noted: 2023-06-05 | Resolved: 2025-07-21

## 2025-07-21 PROBLEM — S80.02XA CONTUSION OF KNEE, LEFT: Status: RESOLVED | Noted: 2023-06-05 | Resolved: 2025-07-21

## 2025-07-21 PROBLEM — T78.40XA ALLERGY: Status: RESOLVED | Noted: 2023-05-25 | Resolved: 2025-07-21

## 2025-07-21 PROBLEM — R55 VASOVAGAL NEAR SYNCOPE: Status: RESOLVED | Noted: 2023-06-05 | Resolved: 2025-07-21

## 2025-07-21 PROBLEM — T07.XXXA LACERATIONS OF MULTIPLE SITES WITHOUT COMPLICATION: Status: RESOLVED | Noted: 2023-06-05 | Resolved: 2025-07-21

## 2025-07-21 PROBLEM — L93.1 SUBACUTE CUTANEOUS LUPUS ERYTHEMATOSUS: Status: ACTIVE | Noted: 2019-04-30

## 2025-07-21 PROBLEM — L72.3 SEBACEOUS CYST: Status: RESOLVED | Noted: 2019-05-30 | Resolved: 2025-07-21

## 2025-07-21 PROCEDURE — 93000 ELECTROCARDIOGRAM COMPLETE: CPT | Performed by: INTERNAL MEDICINE

## 2025-07-21 PROCEDURE — 1036F TOBACCO NON-USER: CPT | Performed by: INTERNAL MEDICINE

## 2025-07-21 PROCEDURE — 1170F FXNL STATUS ASSESSED: CPT | Performed by: INTERNAL MEDICINE

## 2025-07-21 PROCEDURE — 1159F MED LIST DOCD IN RCRD: CPT | Performed by: INTERNAL MEDICINE

## 2025-07-21 PROCEDURE — 99397 PER PM REEVAL EST PAT 65+ YR: CPT | Performed by: INTERNAL MEDICINE

## 2025-07-21 PROCEDURE — 1123F ACP DISCUSS/DSCN MKR DOCD: CPT | Performed by: INTERNAL MEDICINE

## 2025-07-21 PROCEDURE — 99214 OFFICE O/P EST MOD 30 MIN: CPT | Performed by: INTERNAL MEDICINE

## 2025-07-21 PROCEDURE — 1160F RVW MEDS BY RX/DR IN RCRD: CPT | Performed by: INTERNAL MEDICINE

## 2025-07-21 PROCEDURE — G0439 PPPS, SUBSEQ VISIT: HCPCS | Performed by: INTERNAL MEDICINE

## 2025-07-21 RX ORDER — FLUOCINONIDE 0.5 MG/G
1 CREAM TOPICAL
COMMUNITY
Start: 2017-09-12

## 2025-07-21 RX ORDER — KETOCONAZOLE 20 MG/ML
1 SHAMPOO, SUSPENSION TOPICAL
COMMUNITY
Start: 2017-09-12

## 2025-07-21 ASSESSMENT — ACTIVITIES OF DAILY LIVING (ADL)
GROCERY_SHOPPING: INDEPENDENT
DOING_HOUSEWORK: INDEPENDENT
TAKING_MEDICATION: INDEPENDENT
BATHING: INDEPENDENT
MANAGING_FINANCES: INDEPENDENT
DRESSING: INDEPENDENT

## 2025-07-21 ASSESSMENT — ENCOUNTER SYMPTOMS
RHINORRHEA: 0
CONSTIPATION: 0
ADENOPATHY: 0
WHEEZING: 0
BACK PAIN: 0
DEPRESSION: 0
ABDOMINAL DISTENTION: 0
SINUS PAIN: 0
LOSS OF SENSATION IN FEET: 0
EYE ITCHING: 0
SORE THROAT: 0
SEIZURES: 0
HYPERACTIVE: 0
NECK PAIN: 0
SLEEP DISTURBANCE: 0
EYE DISCHARGE: 0
DIARRHEA: 0
MYALGIAS: 0
HEADACHES: 0
HEMATURIA: 0
FREQUENCY: 0
SHORTNESS OF BREATH: 0
VOICE CHANGE: 0
VOMITING: 0
DECREASED CONCENTRATION: 0
FEVER: 0
CHILLS: 0
LIGHT-HEADEDNESS: 0
NERVOUS/ANXIOUS: 0
NECK STIFFNESS: 0
COUGH: 0
ARTHRALGIAS: 0
DIFFICULTY URINATING: 0
COLOR CHANGE: 0
NAUSEA: 0
ACTIVITY CHANGE: 0
DYSURIA: 0
PALPITATIONS: 0
ABDOMINAL PAIN: 0
FATIGUE: 0
DIZZINESS: 0
WEAKNESS: 0
BRUISES/BLEEDS EASILY: 0
OCCASIONAL FEELINGS OF UNSTEADINESS: 0
APPETITE CHANGE: 0
CHEST TIGHTNESS: 0
SINUS PRESSURE: 0
DYSPHORIC MOOD: 0

## 2025-07-21 ASSESSMENT — PATIENT HEALTH QUESTIONNAIRE - PHQ9
1. LITTLE INTEREST OR PLEASURE IN DOING THINGS: NOT AT ALL
SUM OF ALL RESPONSES TO PHQ9 QUESTIONS 1 AND 2: 0
2. FEELING DOWN, DEPRESSED OR HOPELESS: NOT AT ALL

## 2025-07-21 NOTE — PROGRESS NOTES
Subjective   Reason for Visit: Mera Ansari is an 84 y.o. female patient comes in today to establish with a new PCP, as well as for comprehensive physical and follow-up of medical conditions in conjunction with annual wellness visit    Past Medical, Surgical, and Family History reviewed and updated in chart.    Reviewed all medications by prescribing practitioner or clinical pharmacist (such as prescriptions, OTCs, herbal therapies and supplements) and documented in the medical record.    Ms. Ansari comes in today to establish with a new PCP, for comprehensive physical and follow-up of medical conditions in conjunction with annual wellness visit, dictated in a separate subsection.  Please refer to that portion of the note for details on healthcare maintenance and screening studies.  She is followed by dermatology for vitiligo, as well as orthopedics intermittently after her right hip replacement, but has not required follow-up recently.  She considers herself generally healthy.  There is a questionable diagnosis of FMD, and she has had vasovagal syncope in the past.  She has not been followed by a specialist for this.  She is overdue on some of her healthcare maintenance studies but keeps up with routine vaccines.  Her most life-changing event recently is that her  passed away in March and she is still in the grieving stages, seemingly doing reasonably well excellent friend and family support.  His celebration of life is this weekend, so she does have quite a busy and emotional week upcoming.  She denies any headaches, dizziness, chest pain or palpitations, shortness of breath nor cough, nausea, vomiting, nor changes in bowel or bladder habits.  She plans on returning for fasting blood work.        Patient Care Team:  Tyesha Cervantes MD as PCP - General (Internal Medicine)  Rebel Garcia MD as PCP - [employer] PCP  Fiona Mejias RN as Nurse Navigator (Orthopaedic Surgery)     Review of Systems  "  Constitutional:  Negative for activity change, appetite change, chills, fatigue and fever.   HENT:  Positive for hearing loss. Negative for congestion, ear pain, postnasal drip, rhinorrhea, sinus pressure, sinus pain, sneezing, sore throat, tinnitus and voice change.    Eyes:  Negative for discharge, itching and visual disturbance.   Respiratory:  Negative for cough, chest tightness, shortness of breath and wheezing.    Cardiovascular:  Negative for chest pain, palpitations and leg swelling.   Gastrointestinal:  Negative for abdominal distention, abdominal pain, constipation, diarrhea, nausea and vomiting.   Endocrine: Negative for cold intolerance, heat intolerance and polyuria.   Genitourinary:  Negative for difficulty urinating, dysuria, frequency, hematuria, urgency, vaginal bleeding and vaginal discharge.   Musculoskeletal:  Negative for arthralgias, back pain, myalgias, neck pain and neck stiffness.   Skin:  Negative for color change, pallor and rash.   Allergic/Immunologic: Negative for environmental allergies, food allergies and immunocompromised state.   Neurological:  Negative for dizziness, seizures, syncope (History of syncope in the past, none recently), weakness, light-headedness and headaches.   Hematological:  Negative for adenopathy. Does not bruise/bleed easily.   Psychiatric/Behavioral:  Negative for behavioral problems, decreased concentration, dysphoric mood and sleep disturbance. The patient is not nervous/anxious and is not hyperactive.         Grief reaction as per HPI       Objective   Vitals:  /80   Pulse 72   Ht 1.575 m (5' 2\")   Wt 50.1 kg (110 lb 6.4 oz)   SpO2 95%   BMI 20.19 kg/m²       Physical Exam  Constitutional:       General: She is not in acute distress.     Appearance: Normal appearance. She is well-developed. She is not ill-appearing.   HENT:      Head: Normocephalic.      Right Ear: Tympanic membrane, ear canal and external ear normal.      Left Ear: Tympanic " membrane, ear canal and external ear normal.      Nose: Nose normal.      Mouth/Throat:      Mouth: Mucous membranes are moist.      Pharynx: Oropharynx is clear. No oropharyngeal exudate or posterior oropharyngeal erythema.     Eyes:      General: Lids are normal. No scleral icterus.     Extraocular Movements: Extraocular movements intact.      Conjunctiva/sclera: Conjunctivae normal.      Pupils: Pupils are equal, round, and reactive to light.     Neck:      Vascular: No carotid bruit.     Cardiovascular:      Rate and Rhythm: Normal rate and regular rhythm.      Pulses: Normal pulses.      Heart sounds: No murmur heard.     No gallop.   Pulmonary:      Effort: Pulmonary effort is normal. No respiratory distress.      Breath sounds: No wheezing, rhonchi or rales.   Chest:   Breasts:     Right: No mass.      Left: No mass.   Abdominal:      General: Bowel sounds are normal. There is no distension.      Palpations: Abdomen is soft. There is no mass.      Tenderness: There is no abdominal tenderness. There is no guarding or rebound.   Genitourinary:     Comments: Patient defers    Musculoskeletal:         General: No swelling or signs of injury.      Cervical back: Normal range of motion and neck supple. No tenderness.      Right lower leg: No edema.      Left lower leg: No edema.   Lymphadenopathy:      Cervical: No cervical adenopathy.      Upper Body:      Right upper body: No supraclavicular or axillary adenopathy.      Left upper body: No supraclavicular or axillary adenopathy.     Skin:     General: Skin is warm and dry.      Coloration: Skin is not pale.      Findings: No bruising or rash.     Neurological:      General: No focal deficit present.      Mental Status: She is alert and oriented to person, place, and time.      Cranial Nerves: No cranial nerve deficit.      Motor: No weakness.      Coordination: Coordination normal.      Gait: Gait normal.     Psychiatric:         Mood and Affect: Mood normal.          Behavior: Behavior normal.         Thought Content: Thought content normal.         Judgment: Judgment normal.         Assessment & Plan    Full age-appropriate comprehensive physical exam and health care maintenance performed and discussed today.  Routine safety and preventative measures discussed including self breast exam, seatbelt use, no drinking and driving, no texting and driving, abstinence or cessation of tobacco use, routine dental and vision exams, healthy diet and regular exercise.    We will update comprehensive labs and follow-up on results once available.  She plans on returning when fasting.  Overdue for mammogram and DEXA.  Requisitions placed.  EKG as above.  She does not recall when last colonoscopy was, but likely not needed repeat based on advanced age.  Keeps up with routine vaccines, has completed shingles, RSV and pneumonia vaccine series.  Could consider updated tetanus.  Has recently received COVID booster and recommend annual flu shots.    In addition to the above-mentioned healthcare maintenance and screening studies, the following were discussed and assessed in detail today:  1.  Arrhythmia: On EKG, frequent PACs, short run, asymptomatic but concerning for possible underlying arrhythmia.  Referral placed to cardiology, she does request to see the same cardiologist that her  was following with, reasonable to set up initial appointment.  2.  Hyperlipidemia with questionable FMD: There is evidence on prior scans of possible FMD.  Continues on lipid-lowering therapy and aspirin.  Again, referral to cardiology.  3.  Osteoporosis: Overdue for DEXA.  Update vitamin D with labs as well.  4.  Hypothyroidism: Update comprehensive labs, make any adjustments as may be indicated.  5.  Grief reaction: Understandable and seemingly doing well, appropriate response, excellent support from family and friends.  Could consider grief therapy/counseling through hospice as her  was closely  involved.  6.  Mildly elevated blood pressure reading: Understandable given situation currently.  Monitors this at home and typically running in the 120s systolic.  7.  Vitiligo: Follows closely with dermatology.    Happy to see her back annually.  She is to contact us with any questions.

## 2025-07-21 NOTE — PATIENT INSTRUCTIONS
It was a pleasure meeting you in the office today.  At a time that is convenient for you, please obtain your bloodwork on a fasting basis.  We will then follow up on these results once available.  Please consider updating a mammogram and a bone density scan as well.  Thank you for keeping up with routine vaccines.  I have placed a referral to a cardiology specialist as well.  Please contact us if you have any questions or need additional refills, otherwise we are happy to see you annually for your wellness visits.

## 2025-07-31 LAB
25(OH)D3+25(OH)D2 SERPL-MCNC: 53 NG/ML (ref 30–100)
ALBUMIN SERPL-MCNC: 4.2 G/DL (ref 3.6–5.1)
ALBUMIN/CREAT UR: NORMAL MG/G CREAT
ALP SERPL-CCNC: 46 U/L (ref 37–153)
ALT SERPL-CCNC: 15 U/L (ref 6–29)
ANION GAP SERPL CALCULATED.4IONS-SCNC: 9 MMOL/L (CALC) (ref 7–17)
APPEARANCE UR: CLEAR
AST SERPL-CCNC: 21 U/L (ref 10–35)
BACTERIA #/AREA URNS HPF: ABNORMAL /HPF
BASOPHILS # BLD AUTO: 62 CELLS/UL (ref 0–200)
BASOPHILS NFR BLD AUTO: 1.1 %
BILIRUB SERPL-MCNC: 0.7 MG/DL (ref 0.2–1.2)
BILIRUB UR QL STRIP: NEGATIVE
BUN SERPL-MCNC: 16 MG/DL (ref 7–25)
CALCIUM SERPL-MCNC: 9.6 MG/DL (ref 8.6–10.4)
CHLORIDE SERPL-SCNC: 102 MMOL/L (ref 98–110)
CHOLEST SERPL-MCNC: 168 MG/DL
CHOLEST/HDLC SERPL: 2.2 (CALC)
CK SERPL-CCNC: 63 U/L (ref 16–215)
CO2 SERPL-SCNC: 28 MMOL/L (ref 20–32)
COLOR UR: YELLOW
CREAT SERPL-MCNC: 0.68 MG/DL (ref 0.6–0.95)
CREAT UR-MCNC: 31 MG/DL (ref 20–275)
EGFRCR SERPLBLD CKD-EPI 2021: 86 ML/MIN/1.73M2
EOSINOPHIL # BLD AUTO: 140 CELLS/UL (ref 15–500)
EOSINOPHIL NFR BLD AUTO: 2.5 %
ERYTHROCYTE [DISTWIDTH] IN BLOOD BY AUTOMATED COUNT: 12.7 % (ref 11–15)
EST. AVERAGE GLUCOSE BLD GHB EST-MCNC: 114 MG/DL
EST. AVERAGE GLUCOSE BLD GHB EST-SCNC: 6.3 MMOL/L
GLUCOSE SERPL-MCNC: 90 MG/DL (ref 65–99)
GLUCOSE UR QL STRIP: NEGATIVE
HBA1C MFR BLD: 5.6 %
HCT VFR BLD AUTO: 43.9 % (ref 35–45)
HDLC SERPL-MCNC: 76 MG/DL
HGB BLD-MCNC: 14 G/DL (ref 11.7–15.5)
HGB UR QL STRIP: NEGATIVE
HYALINE CASTS #/AREA URNS LPF: ABNORMAL /LPF
IRON SATN MFR SERPL: 26 % (CALC) (ref 16–45)
IRON SERPL-MCNC: 80 MCG/DL (ref 45–160)
KETONES UR QL STRIP: NEGATIVE
LDLC SERPL CALC-MCNC: 79 MG/DL (CALC)
LEUKOCYTE ESTERASE UR QL STRIP: ABNORMAL
LYMPHOCYTES # BLD AUTO: 2290 CELLS/UL (ref 850–3900)
LYMPHOCYTES NFR BLD AUTO: 40.9 %
MAGNESIUM SERPL-MCNC: 2.2 MG/DL (ref 1.5–2.5)
MCH RBC QN AUTO: 30.6 PG (ref 27–33)
MCHC RBC AUTO-ENTMCNC: 31.9 G/DL (ref 32–36)
MCV RBC AUTO: 95.9 FL (ref 80–100)
MICROALBUMIN UR-MCNC: <0.2 MG/DL
MONOCYTES # BLD AUTO: 493 CELLS/UL (ref 200–950)
MONOCYTES NFR BLD AUTO: 8.8 %
NEUTROPHILS # BLD AUTO: 2615 CELLS/UL (ref 1500–7800)
NEUTROPHILS NFR BLD AUTO: 46.7 %
NITRITE UR QL STRIP: POSITIVE
NONHDLC SERPL-MCNC: 92 MG/DL (CALC)
PH UR STRIP: 6 [PH] (ref 5–8)
PLATELET # BLD AUTO: 328 THOUSAND/UL (ref 140–400)
PMV BLD REES-ECKER: 10.3 FL (ref 7.5–12.5)
POTASSIUM SERPL-SCNC: 4.9 MMOL/L (ref 3.5–5.3)
PROT SERPL-MCNC: 6.8 G/DL (ref 6.1–8.1)
PROT UR QL STRIP: NEGATIVE
RBC # BLD AUTO: 4.58 MILLION/UL (ref 3.8–5.1)
RBC #/AREA URNS HPF: ABNORMAL /HPF
SERVICE CMNT-IMP: ABNORMAL
SODIUM SERPL-SCNC: 139 MMOL/L (ref 135–146)
SP GR UR STRIP: 1.01 (ref 1–1.03)
SQUAMOUS #/AREA URNS HPF: ABNORMAL /HPF
TIBC SERPL-MCNC: 308 MCG/DL (CALC) (ref 250–450)
TRIGL SERPL-MCNC: 46 MG/DL
TSH SERPL-ACNC: 2.61 MIU/L (ref 0.4–4.5)
VIT B12 SERPL-MCNC: 376 PG/ML (ref 200–1100)
WBC # BLD AUTO: 5.6 THOUSAND/UL (ref 3.8–10.8)
WBC #/AREA URNS HPF: ABNORMAL /HPF

## 2025-08-13 ENCOUNTER — APPOINTMENT (OUTPATIENT)
Dept: RADIOLOGY | Facility: CLINIC | Age: 85
End: 2025-08-13
Payer: MEDICARE

## 2025-08-13 DIAGNOSIS — Z12.31 ENCOUNTER FOR SCREENING MAMMOGRAM FOR MALIGNANT NEOPLASM OF BREAST: ICD-10-CM

## 2025-08-13 PROCEDURE — 77067 SCR MAMMO BI INCL CAD: CPT

## 2025-08-13 PROCEDURE — 77067 SCR MAMMO BI INCL CAD: CPT | Performed by: RADIOLOGY

## 2025-08-13 PROCEDURE — 77063 BREAST TOMOSYNTHESIS BI: CPT | Performed by: RADIOLOGY

## 2025-08-28 ENCOUNTER — HOSPITAL ENCOUNTER (OUTPATIENT)
Dept: RADIOLOGY | Facility: CLINIC | Age: 85
Discharge: HOME | End: 2025-08-28
Payer: MEDICARE

## 2025-08-28 DIAGNOSIS — Z78.0 POSTMENOPAUSAL ESTROGEN DEFICIENCY: ICD-10-CM

## 2025-08-28 PROCEDURE — 77080 DXA BONE DENSITY AXIAL: CPT

## 2025-08-29 DIAGNOSIS — E03.9 HYPOTHYROIDISM, UNSPECIFIED TYPE: Primary | ICD-10-CM

## 2025-08-29 RX ORDER — LEVOTHYROXINE SODIUM 88 UG/1
88 TABLET ORAL DAILY
Qty: 90 TABLET | Refills: 3 | Status: SHIPPED | OUTPATIENT
Start: 2025-08-29

## 2025-10-02 ENCOUNTER — APPOINTMENT (OUTPATIENT)
Dept: ORTHOPEDIC SURGERY | Facility: CLINIC | Age: 85
End: 2025-10-02
Payer: MEDICARE

## 2026-07-24 ENCOUNTER — APPOINTMENT (OUTPATIENT)
Dept: PRIMARY CARE | Facility: CLINIC | Age: 86
End: 2026-07-24
Payer: MEDICARE

## (undated) DEVICE — SUTURE, VICRYL, 2-0, 27 IN, FSL, UNDYED

## (undated) DEVICE — HOOD, STERISHIELD T4 SYSTEM

## (undated) DEVICE — SUTURE, VICRYL, 1, 27 IN, CT-1, VIOLET

## (undated) DEVICE — GLOVE, SURGICAL, PROTEXIS PI MICRO, 8.0, PF, LF

## (undated) DEVICE — GLOVE, SURGICAL, PROTEXIS PI ORTHO, 8.0, PF, LF

## (undated) DEVICE — SOLUTION, IRRIGATION, STERILE WATER, 1000 ML, POUR BOTTLE

## (undated) DEVICE — SUTURE, VICRYL, 0, 36 IN, CT-1, UNDYED

## (undated) DEVICE — SUTURE, ETHIBOND, P2, V-37, 30 IN, GREEN